# Patient Record
Sex: MALE | Race: WHITE | NOT HISPANIC OR LATINO | Employment: OTHER | URBAN - METROPOLITAN AREA
[De-identification: names, ages, dates, MRNs, and addresses within clinical notes are randomized per-mention and may not be internally consistent; named-entity substitution may affect disease eponyms.]

---

## 2023-02-27 PROBLEM — B36.0 TINEA VERSICOLOR: Status: ACTIVE | Noted: 2023-02-27

## 2023-02-27 PROBLEM — G25.0 BENIGN ESSENTIAL TREMOR: Status: ACTIVE | Noted: 2023-02-27

## 2023-02-27 PROBLEM — H47.20 LEFT OPTIC NERVE ATROPHY: Status: ACTIVE | Noted: 2023-02-27

## 2023-02-27 PROBLEM — E55.9 VITAMIN D DEFICIENCY: Status: ACTIVE | Noted: 2023-02-27

## 2023-02-27 PROBLEM — R29.898 WEAKNESS OF EXTREMITY: Status: ACTIVE | Noted: 2023-02-27

## 2023-02-27 PROBLEM — L11.1 TRANSIENT ACANTHOLYTIC DERMATOSIS: Status: ACTIVE | Noted: 2023-02-27

## 2023-02-27 PROBLEM — R73.03 PREDIABETES: Status: ACTIVE | Noted: 2023-02-27

## 2023-02-27 PROBLEM — H40.9 GLAUCOMA: Status: ACTIVE | Noted: 2023-02-27

## 2023-02-27 PROBLEM — M79.10 MYALGIA: Status: ACTIVE | Noted: 2023-02-27

## 2023-02-27 PROBLEM — R73.9 ELEVATED BLOOD SUGAR: Status: ACTIVE | Noted: 2023-02-27

## 2023-02-27 PROBLEM — R10.13 EPIGASTRIC PAIN DETERMINED BY EXAMINATION: Status: ACTIVE | Noted: 2023-02-27

## 2023-02-27 PROBLEM — M54.32 SCIATICA OF LEFT SIDE: Status: ACTIVE | Noted: 2023-02-27

## 2023-02-27 PROBLEM — M35.3 POLYMYALGIA RHEUMATICA (MULTI): Status: ACTIVE | Noted: 2023-02-27

## 2023-02-27 PROBLEM — M54.50 LOW BACK PAIN: Status: ACTIVE | Noted: 2023-02-27

## 2023-02-27 PROBLEM — M26.69 TMJ INFLAMMATION: Status: ACTIVE | Noted: 2023-02-27

## 2023-02-27 PROBLEM — N48.1 BALANITIS: Status: ACTIVE | Noted: 2023-02-27

## 2023-02-27 PROBLEM — H25.10 NUCLEAR SCLEROSIS: Status: ACTIVE | Noted: 2023-02-27

## 2023-02-27 PROBLEM — K60.2 ANAL FISSURE: Status: ACTIVE | Noted: 2023-02-27

## 2023-02-27 PROBLEM — L28.2 PRURITIC RASH: Status: ACTIVE | Noted: 2023-02-27

## 2023-02-27 RX ORDER — GABAPENTIN 100 MG/1
100 CAPSULE ORAL 2 TIMES DAILY
COMMUNITY
End: 2023-03-16 | Stop reason: SDUPTHER

## 2023-02-28 ENCOUNTER — HOSPITAL ENCOUNTER (INPATIENT)
Dept: DATA CONVERSION | Facility: HOSPITAL | Age: 78
Discharge: HOME HEALTH | End: 2023-03-05
Attending: STUDENT IN AN ORGANIZED HEALTH CARE EDUCATION/TRAINING PROGRAM | Admitting: STUDENT IN AN ORGANIZED HEALTH CARE EDUCATION/TRAINING PROGRAM
Payer: MEDICARE

## 2023-02-28 DIAGNOSIS — M54.12 RADICULOPATHY, CERVICAL REGION: ICD-10-CM

## 2023-02-28 DIAGNOSIS — M89.9 DISORDER OF BONE, UNSPECIFIED: ICD-10-CM

## 2023-02-28 DIAGNOSIS — R91.8 OTHER NONSPECIFIC ABNORMAL FINDING OF LUNG FIELD: ICD-10-CM

## 2023-02-28 DIAGNOSIS — H40.9 UNSPECIFIED GLAUCOMA: ICD-10-CM

## 2023-02-28 DIAGNOSIS — Z88.8 ALLERGY STATUS TO OTHER DRUGS, MEDICAMENTS AND BIOLOGICAL SUBSTANCES: ICD-10-CM

## 2023-02-28 DIAGNOSIS — R63.4 ABNORMAL WEIGHT LOSS: ICD-10-CM

## 2023-02-28 DIAGNOSIS — Z77.22 CONTACT WITH AND (SUSPECTED) EXPOSURE TO ENVIRONMENTAL TOBACCO SMOKE (ACUTE) (CHRONIC): ICD-10-CM

## 2023-02-28 DIAGNOSIS — M84.48XA PATHOLOGICAL FRACTURE, OTHER SITE, INITIAL ENCOUNTER FOR FRACTURE: ICD-10-CM

## 2023-02-28 DIAGNOSIS — C61 MALIGNANT NEOPLASM OF PROSTATE (MULTI): ICD-10-CM

## 2023-02-28 DIAGNOSIS — M84.421A: ICD-10-CM
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
## 2023-02-28 DIAGNOSIS — R73.03 PREDIABETES: ICD-10-CM

## 2023-02-28 DIAGNOSIS — I10 ESSENTIAL (PRIMARY) HYPERTENSION: ICD-10-CM

## 2023-02-28 DIAGNOSIS — D63.0 ANEMIA IN NEOPLASTIC DISEASE: ICD-10-CM

## 2023-02-28 DIAGNOSIS — M54.16 RADICULOPATHY, LUMBAR REGION: ICD-10-CM

## 2023-02-28 DIAGNOSIS — C79.51 SECONDARY MALIGNANT NEOPLASM OF BONE (MULTI): ICD-10-CM

## 2023-02-28 DIAGNOSIS — R59.0 LOCALIZED ENLARGED LYMPH NODES: ICD-10-CM

## 2023-02-28 LAB
POCT GLUCOSE: 125 MG/DL (ref 74–99)
POCT GLUCOSE: 133 MG/DL (ref 74–99)
POCT GLUCOSE: 135 MG/DL (ref 74–99)

## 2023-03-01 LAB
ALANINE AMINOTRANSFERASE (SGPT) (U/L) IN SER/PLAS: 14 U/L (ref 10–52)
ALBUMIN (G/DL) IN SER/PLAS: 3.1 G/DL (ref 3.4–5)
ALKALINE PHOSPHATASE (U/L) IN SER/PLAS: 450 U/L (ref 33–136)
ANION GAP IN SER/PLAS: 16 MMOL/L (ref 10–20)
ASPARTATE AMINOTRANSFERASE (SGOT) (U/L) IN SER/PLAS: 27 U/L (ref 9–39)
BASOPHILS (10*3/UL) IN BLOOD BY AUTOMATED COUNT: 0.04 X10E9/L (ref 0–0.1)
BASOPHILS/100 LEUKOCYTES IN BLOOD BY AUTOMATED COUNT: 0.7 % (ref 0–2)
BILIRUBIN TOTAL (MG/DL) IN SER/PLAS: 0.4 MG/DL (ref 0–1.2)
CALCIUM (MG/DL) IN SER/PLAS: 8.3 MG/DL (ref 8.6–10.6)
CARBON DIOXIDE, TOTAL (MMOL/L) IN SER/PLAS: 25 MMOL/L (ref 21–32)
CHLORIDE (MMOL/L) IN SER/PLAS: 100 MMOL/L (ref 98–107)
CREATININE (MG/DL) IN SER/PLAS: 0.54 MG/DL (ref 0.5–1.3)
EOSINOPHILS (10*3/UL) IN BLOOD BY AUTOMATED COUNT: 0.1 X10E9/L (ref 0–0.4)
EOSINOPHILS/100 LEUKOCYTES IN BLOOD BY AUTOMATED COUNT: 1.7 % (ref 0–6)
ERYTHROCYTE DISTRIBUTION WIDTH (RATIO) BY AUTOMATED COUNT: 16.3 % (ref 11.5–14.5)
ERYTHROCYTE MEAN CORPUSCULAR HEMOGLOBIN CONCENTRATION (G/DL) BY AUTOMATED: 30.4 G/DL (ref 32–36)
ERYTHROCYTE MEAN CORPUSCULAR VOLUME (FL) BY AUTOMATED COUNT: 94 FL (ref 80–100)
ERYTHROCYTES (10*6/UL) IN BLOOD BY AUTOMATED COUNT: 3.3 X10E12/L (ref 4.5–5.9)
FERRITIN (UG/LL) IN SER/PLAS: 1237 UG/L (ref 20–300)
GFR MALE: >90 ML/MIN/1.73M2
GLUCOSE (MG/DL) IN SER/PLAS: 286 MG/DL (ref 74–99)
HEMATOCRIT (%) IN BLOOD BY AUTOMATED COUNT: 30.9 % (ref 41–52)
HEMOGLOBIN (G/DL) IN BLOOD: 9.4 G/DL (ref 13.5–17.5)
IMMATURE GRANULOCYTES/100 LEUKOCYTES IN BLOOD BY AUTOMATED COUNT: 4.3 % (ref 0–0.9)
IRON (UG/DL) IN SER/PLAS: 61 UG/DL (ref 35–150)
IRON BINDING CAPACITY (UG/DL) IN SER/PLAS: 223 UG/DL (ref 240–445)
IRON SATURATION (%) IN SER/PLAS: 27 % (ref 25–45)
LEUKOCYTES (10*3/UL) IN BLOOD BY AUTOMATED COUNT: 5.8 X10E9/L (ref 4.4–11.3)
LYMPHOCYTES (10*3/UL) IN BLOOD BY AUTOMATED COUNT: 1 X10E9/L (ref 0.8–3)
LYMPHOCYTES/100 LEUKOCYTES IN BLOOD BY AUTOMATED COUNT: 17.2 % (ref 13–44)
MAGNESIUM (MG/DL) IN SER/PLAS: 1.92 MG/DL (ref 1.6–2.4)
MONOCYTES (10*3/UL) IN BLOOD BY AUTOMATED COUNT: 0.71 X10E9/L (ref 0.05–0.8)
MONOCYTES/100 LEUKOCYTES IN BLOOD BY AUTOMATED COUNT: 12.2 % (ref 2–10)
NEUTROPHILS (10*3/UL) IN BLOOD BY AUTOMATED COUNT: 3.71 X10E9/L (ref 1.6–5.5)
NEUTROPHILS/100 LEUKOCYTES IN BLOOD BY AUTOMATED COUNT: 63.9 % (ref 40–80)
NRBC (PER 100 WBCS) BY AUTOMATED COUNT: 0 /100 WBC (ref 0–0)
PLATELETS (10*3/UL) IN BLOOD AUTOMATED COUNT: 311 X10E9/L (ref 150–450)
POTASSIUM (MMOL/L) IN SER/PLAS: 3.5 MMOL/L (ref 3.5–5.3)
PROTEIN TOTAL: 5.2 G/DL (ref 6.4–8.2)
PROTEIN TOTAL: 5.2 G/DL (ref 6.4–8.2)
SODIUM (MMOL/L) IN SER/PLAS: 137 MMOL/L (ref 136–145)
UREA NITROGEN (MG/DL) IN SER/PLAS: 10 MG/DL (ref 6–23)

## 2023-03-02 LAB
ALANINE AMINOTRANSFERASE (SGPT) (U/L) IN SER/PLAS: 17 U/L (ref 10–52)
ALBUMIN (G/DL) IN SER/PLAS: 3.1 G/DL (ref 3.4–5)
ALKALINE PHOSPHATASE (U/L) IN SER/PLAS: 443 U/L (ref 33–136)
ANION GAP IN SER/PLAS: 16 MMOL/L (ref 10–20)
ASPARTATE AMINOTRANSFERASE (SGOT) (U/L) IN SER/PLAS: 30 U/L (ref 9–39)
BILIRUBIN TOTAL (MG/DL) IN SER/PLAS: 0.4 MG/DL (ref 0–1.2)
CALCIUM (MG/DL) IN SER/PLAS: 8.3 MG/DL (ref 8.6–10.6)
CARBON DIOXIDE, TOTAL (MMOL/L) IN SER/PLAS: 27 MMOL/L (ref 21–32)
CHLORIDE (MMOL/L) IN SER/PLAS: 101 MMOL/L (ref 98–107)
CREATININE (MG/DL) IN SER/PLAS: 0.63 MG/DL (ref 0.5–1.3)
ERYTHROCYTE DISTRIBUTION WIDTH (RATIO) BY AUTOMATED COUNT: 16.1 % (ref 11.5–14.5)
ERYTHROCYTE MEAN CORPUSCULAR HEMOGLOBIN CONCENTRATION (G/DL) BY AUTOMATED: 30.5 G/DL (ref 32–36)
ERYTHROCYTE MEAN CORPUSCULAR VOLUME (FL) BY AUTOMATED COUNT: 93 FL (ref 80–100)
ERYTHROCYTES (10*6/UL) IN BLOOD BY AUTOMATED COUNT: 3.13 X10E12/L (ref 4.5–5.9)
GFR MALE: >90 ML/MIN/1.73M2
GLUCOSE (MG/DL) IN SER/PLAS: 120 MG/DL (ref 74–99)
HEMATOCRIT (%) IN BLOOD BY AUTOMATED COUNT: 29.2 % (ref 41–52)
HEMOGLOBIN (G/DL) IN BLOOD: 8.9 G/DL (ref 13.5–17.5)
LEUKOCYTES (10*3/UL) IN BLOOD BY AUTOMATED COUNT: 7 X10E9/L (ref 4.4–11.3)
MAGNESIUM (MG/DL) IN SER/PLAS: 2.16 MG/DL (ref 1.6–2.4)
NRBC (PER 100 WBCS) BY AUTOMATED COUNT: 0 /100 WBC (ref 0–0)
PHOSPHATE (MG/DL) IN SER/PLAS: 3.8 MG/DL (ref 2.5–4.9)
PLATELETS (10*3/UL) IN BLOOD AUTOMATED COUNT: 356 X10E9/L (ref 150–450)
POTASSIUM (MMOL/L) IN SER/PLAS: 3.7 MMOL/L (ref 3.5–5.3)
PROTEIN TOTAL: 5.2 G/DL (ref 6.4–8.2)
SODIUM (MMOL/L) IN SER/PLAS: 140 MMOL/L (ref 136–145)
UREA NITROGEN (MG/DL) IN SER/PLAS: 12 MG/DL (ref 6–23)

## 2023-03-03 LAB
ALANINE AMINOTRANSFERASE (SGPT) (U/L) IN SER/PLAS: 14 U/L (ref 10–52)
ALBUMIN (G/DL) IN SER/PLAS: 2.9 G/DL (ref 3.4–5)
ALBUMIN/PROTEIN TOTAL (%) IN URINE BY ELECTROPHORESIS: 27.7 %
ALKALINE PHOSPHATASE (U/L) IN SER/PLAS: 401 U/L (ref 33–136)
ALPHA 1 GLOBULIN/PROTEIN TOTAL (%) IN URINE BY ELECTROPHORESIS: 12.6 %
ALPHA 2 GLOBULIN/PROTEIN TOTAL (%) IN URINE BY ELECTROPHORESIS: 29.2 %
ANION GAP IN SER/PLAS: 14 MMOL/L (ref 10–20)
ASPARTATE AMINOTRANSFERASE (SGOT) (U/L) IN SER/PLAS: 27 U/L (ref 9–39)
BETA GLOBULIN/PROTEIN TOTAL (%) IN URINE BY ELECTROPHORESIS: 17.2 %
BILIRUBIN TOTAL (MG/DL) IN SER/PLAS: 0.5 MG/DL (ref 0–1.2)
CALCIUM (MG/DL) IN SER/PLAS: 8.2 MG/DL (ref 8.6–10.6)
CARBON DIOXIDE, TOTAL (MMOL/L) IN SER/PLAS: 28 MMOL/L (ref 21–32)
CHLORIDE (MMOL/L) IN SER/PLAS: 101 MMOL/L (ref 98–107)
CREATININE (MG/DL) IN SER/PLAS: 0.66 MG/DL (ref 0.5–1.3)
ERYTHROCYTE DISTRIBUTION WIDTH (RATIO) BY AUTOMATED COUNT: 16.4 % (ref 11.5–14.5)
ERYTHROCYTE MEAN CORPUSCULAR HEMOGLOBIN CONCENTRATION (G/DL) BY AUTOMATED: 31 G/DL (ref 32–36)
ERYTHROCYTE MEAN CORPUSCULAR VOLUME (FL) BY AUTOMATED COUNT: 93 FL (ref 80–100)
ERYTHROCYTES (10*6/UL) IN BLOOD BY AUTOMATED COUNT: 2.99 X10E12/L (ref 4.5–5.9)
GAMMA GLOBULIN/PROTEIN TOTAL (%) IN URINE BY ELECTROPHORESIS: 13.3 %
GFR MALE: >90 ML/MIN/1.73M2
GLUCOSE (MG/DL) IN SER/PLAS: 105 MG/DL (ref 74–99)
HEMATOCRIT (%) IN BLOOD BY AUTOMATED COUNT: 27.7 % (ref 41–52)
HEMOGLOBIN (G/DL) IN BLOOD: 8.6 G/DL (ref 13.5–17.5)
IMMUNOFIXATION INTERPRETATION: ABNORMAL
LEUKOCYTES (10*3/UL) IN BLOOD BY AUTOMATED COUNT: 6.3 X10E9/L (ref 4.4–11.3)
M-PROTEIN 1 URINE %: 2.4 %
MAGNESIUM (MG/DL) IN SER/PLAS: 2.04 MG/DL (ref 1.6–2.4)
NRBC (PER 100 WBCS) BY AUTOMATED COUNT: 0 /100 WBC (ref 0–0)
PATH REVIEW - URINE IMMUNOFIXATION: NORMAL
PATH REVIEW-URINE PROTEIN ELECTROPHORESIS: NORMAL
PLATELETS (10*3/UL) IN BLOOD AUTOMATED COUNT: 343 X10E9/L (ref 150–450)
POTASSIUM (MMOL/L) IN SER/PLAS: 4.1 MMOL/L (ref 3.5–5.3)
PROTEIN (MG/DL) IN URINE: 32 MG/DL (ref 5–25)
PROTEIN TOTAL: 4.8 G/DL (ref 6.4–8.2)
SODIUM (MMOL/L) IN SER/PLAS: 139 MMOL/L (ref 136–145)
UPEP INTERPRETATION: ABNORMAL
UREA NITROGEN (MG/DL) IN SER/PLAS: 11 MG/DL (ref 6–23)

## 2023-03-03 NOTE — H&P
History of Present Illness:   HPI:    Shalom Horton is a 78 yo male with a history of severe degenerative changes of the spine causing chronic radicular leg and arm pain, and glaucoma who presented to [facility] ED  on 2/27 after he lost his balance putting on his shoes. He grabbed onto the door to try and stop his fall and heard a pop followed by pain prompting him to present to the ER for evaluation. In the ER he had an XR which showed a fracture of the mid-shaft  of the R humerus as well as sclerotic lesions concerning for metastatic disease. Humerus fracture was concerning for a pathologic fracture. Patient was discussed with ortho who recommended admission to medicine with ortho consult for evaluation and management  of pathologic fracture.     Of note, patient had a MRI of the cervical spine (at [facility] 12/29/2022, report available in ambulatory EMR) at the end of December due to new onset left arm pain, numbness, and tingling. This MRI showed 1. Diffusely decreased signal on T1-weighted  imaging in the visualized bone marrow, which is nonspecific and can be seen with hematopoietic marrow in the setting of anemia, however diffuse marrow replacing process, including neoplastic processes are not excluded. Additionally, there is possible  cortical breakthrough of soft tissue at the C7-T1 level on the left. Correlate for history of primary metastatic disease. Recommend bone scan, PET-CT, or CT of the chest, abdomen, and pelvis for further evaluation. 2. Moderate spinal canal narrowing at  C5-C6 and mild to moderate spinal canal narrowing at C6-C7. Mild spinal canal narrowing at C3-C4 and C4-C5. 3. Otherwise, multilevele degenerative changes over the cervical spine as described, including severe foraminal narrowing at some levels.    His orthopedist ordered a PET-CT for further evaluation which was scheduled for 2/27 (unknown if patient got this prior to going to ER).    ED Course:  Vitals: T 36.6 C, HR  "84, RR 18, /80, O2 96% on RA  Diagnostics:  CBC: 7.7 > 9.5 / 29.3 < 318  BMP: 138 / 4.1 / 102 / 24 / 14 / 0.56 < 135  Ca: 9.1  LFTs: Alb 3.7, Pro 6.6, , ALT 19, AST 46, Bili 0.4  Coags: PT 13.2, INR 1.1  COVID: negative  XR humerus/shoulder: Angulated possibly subacute and/or pathologic fracture of the humeral shaft.  Multiple sclerotic osseous lesions suspicious for metastatic disease. Nodular interstitial prominence in the right lung field could reflect additional malignancy. Further evaluation recommended.  CT UE: Midshaft fracture of the right humerus, possibly pathologic.  Evidence of metastatic disease.    Interventions: morphine 4 mg IVP q3h PRN, Norco x1    PMHx: as above, prediabetes  PSurgHx: BL knee surgeries, heel surgery, appendectomy, eye surgeries  Meds: see reconciled med list  Allergies: beta blockers  Social Hx: denies T/E/D, lives with wife, hx of [employer] service in [facility], worked in [employer] with \"[employer]\" for several years   FamHx: HTN, CAD, diabetes in sister; bone cancer in an aunt, unspecified cancer in an uncle    PCP: Gian Waller   Code Status: FULL CODE  Emergency Contact: Marnie (Wife - [phone])  Pharmacy: [facility] ([id number])      Social History:   Social History:  Smoking Status never smoker (1)   Alcohol Use denies(1)   Drug Use denies (1)              Allergies:  ·  beta blockers : Unknown    Medications Prior to Admission:     gabapentin 100 mg oral capsule: 1 cap(s) orally 2 times a day  tafluprost 0.0015% ophthalmic solution: 1 drop(s) to LEFT EYE once a day (in the evening)  cholecalciferol 250 mcg (10,000 intl units) oral capsule: 1 cap(s) orally once a day  Vitamin C 1000 mg oral tablet: 1 tab(s) orally once a day  Fish Oil 1000 mg oral capsule: 1 cap(s) orally once a day  Betaine HCL 650m cap(s) orally 3 times a day (with meals).    Objective:     Objective Information:      T   P  R  BP   MAP  SpO2 "   Value  36.8  85  15  174/92   119  94%  Date/Time 2/28 10:26 2/28 10:26 2/28 10:26 2/28 10:26 2/28 10:26 2/28 10:26  Range  (36.8C - 36.8C )  (85 - 85 )  (15 - 15 )  (174 - 174 )/ (92 - 92 )  (119 - 119 )  (94% - 94% )      Pain reported at 2/28 11:15: 8 = Severe    Physical Exam Narrative:  ·  Physical Exam:    Constitutional: Well developed, NAD, AAOx3, alert and cooperative  Head: NCAT  Mouth: MMM  Respiratory: Patent airways, CTAB, symmetric chest expansion, thorax symmetric  Cardiovascular: Regular, rate and rhythm, no murmurs, normal S 1 and S 2  Upper extremities: R arm in sling, ttp over mid arm, ROM intact at elbow and wrist; sensation intact, radial pulse 2+; LUE: swelling and discoloration over proximal forearm at previous IV site; otherwise unremarkable.   Extremities: no visible discoloration, lesions or edema; warm, non-tender; dorsalis pedis pulses 2+, normal bilaterally  Psychological: Appropriate mood and affect    Medications:    Medications:      ALTERNATIVE MEDICINES:    1. Melatonin:  3  mg  Oral  At Bedtime   PRN       2. Omega-3 Acid Ethyl Esters:  1  gram(s)  Oral  Daily      CENTRAL NERVOUS SYSTEM AGENTS:    1. Acetaminophen:  650  mg  Oral  Every 6 Hours   PRN       2. oxyCODONE Immediate Release:  5  mg  Oral  Every 4 Hours   PRN       3. Gabapentin:  100  mg  Oral  2 Times a Day      COAGULATION MODIFIERS:    1. Enoxaparin SubCutaneous:  40  mg  SubCutaneous  Every 24 Hours      GASTROINTESTINAL AGENTS:    1. Docusate:  100  mg  Oral  2 Times a Day      NUTRITIONAL PRODUCTS:    1. Sodium Chloride 0.9% Injectable Flush:  10  mL  IntraVenous Flush  Every 8 Hours and as Needed   PRN         TOPICAL AGENTS:    1. Latanoprost 0.005% Ophthalmic:  1  drop(s)  Both Eyes  At Bedtime      Recent Lab Results:    Results:    CBC: 2/27/2023 14:45              \     Hgb     /                              \     9.5 L    /  WBC  ----------------  Plt               7.7       ----------------    318               /     Hct     \                              /     29.3 L    \            RBC: 3.24 L    MCV: 90     Neutrophil %: 74.0      CMP: 2/27/2023 14:45  NA+        Cl-     BUN  /                         138    102    14  /  --------------------------------  Glucose                ---------------------------  135 H    K+     HCO3-   Creat \                         4.1    24    0.56  \           \  T Bili  /                    \  0.4  /  AST  x ---- x ALT        46 Hx ---- x 19         /  Alk P   \               /  467 H \  Calcium : 9.1     Anion Gap : 16     Albumin : 3.7     T Protein : 6.6           Coagulation: 2/27/2023 14:45  PT  /                    13.2  /  -------<    INR          ----------<      1.1  PTT\                              \                       Radiology Results:    Results:        Impression:    Midshaft fracture of the right humerus, possibly pathologic.     Evidence of metastatic disease.        CT Up Extremity without Contrast [Feb 27 2023 12:16PM]      Impression:    Angulated possibly subacute and/or pathologic fracture of the humeral  shaft.     Multiple sclerotic osseous lesions suspicious for metastatic disease.  Nodular interstitial prominence in the right lung field could reflect  additional malignancy. Further evaluation recommended.     Xray Humerus 2 View [Feb 27 2023 11:33AM]      Impression:    Angulated possibly subacute and/or pathologic fracture of the humeral  shaft.     Multiple sclerotic osseous lesions suspicious for metastatic disease.  Nodular interstitial prominence in the right lung field could reflect  additional malignancy. Further evaluation recommended.     Xray Shoulder Complete Min 2 Views [Feb 27 2023 11:33AM]      Assessment and Plan:   Assessment:    Shalom Horton is a 78yo male with a history of radiular pain x4 extremities d/t degenerative changes of the spine who presented to the ER for RUE pain after fall and was found  to have a likely  pathologic fracture of the R humerus. Now admitted for further evaluation and management of likely malignancy and humerus fracture. Patient hypertensive on admission, likely due to pain, otherwise vitals stable. Ortho on board, will likely  need malignancy workup, will consult heme/onc, however, may require outpatient workup.       #R humerus fracture  -ortho consulted  - type and screen, coags 2/27 at OSH  -pain control w/ acetaminophen 650 mg q6h PRN mild pain    -oxycodone 5 mg q4h PRN severe pain    #Metastatic bone disease  -f/up OSH PET-CT if done, if not done order CT chest/abdomen/pelvis  -regarding possible primaries, up to date on colon cancer screening, no documented hx of prostate cancer screening, no smoking history of lung cancer screening  -MRI findings w/ diffuse marrow findings raise concern for possible hematologic malignancy  -will consult heme/onc pending ortho plan     #Anemia  - unknown cause  - could be related to potential primary malignancy   - order iron studies + peripheral smear    #Glaucoma  - tafluprost 0.0015% - 1 gtt every day at home  - will substitute with formulary latanoprost while admitted     Dispo: Pending clinical course  F: replete as needed  E: replete as needed  N: NPO pending ortho recs  GI: pantoprazole 40 mg daily  DVT: lovenox sq  Code status: full code     Patient seen and discussed with Attending, Dr Jackson Mitchell.   Kacey Parikh MD (MJ).  PGY-1 Family Medicine  [facility]CHALO/[id number]      Attestation:   Note Completion:  I am a:  Resident/Fellow   Attending Attestation I saw and evaluated the patient.  I personally obtained the key and critical portions of the history and physical exam or was physically present for key and  critical portions performed by the resident/fellow. I reviewed the resident/fellow?s documentation and discussed the patient with the resident/fellow.  I agree with the resident/fellow?s medical decision making as documented in the note.     I personally  "evaluated the patient on 2023   Comments/ Additional Findings    FHX: maternal aunt (lifelong smoker)  of \"lung, bone, and blood cancer\".  SHX: 3 years in [employer], never exposed to Agent Orange. 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          Electronic Signatures:  Kacey Parikh (Resident))  (Signed 2023 15:49)   Authored: History of Present Illness, Comorbidities,  Social History, Allergies, Medications Prior to Admission, Objective, Assessment and Plan, Note Completion  Jackson Mitchell)  (Signed 01-Mar-2023 12:59)   Authored: Note Completion   Co-Signer: History of Present Illness, Comorbidities, Social History, Allergies, Medications Prior to Admission, Objective, Assessment  and Plan, Note Completion      Last Updated: 01-Mar-2023 12:59 by Jackson Mitchell)    References:  1.  Data Referenced From \"Provider Note - ED v3\" 2023 11:52   "

## 2023-03-04 LAB
ALANINE AMINOTRANSFERASE (SGPT) (U/L) IN SER/PLAS: 23 U/L (ref 10–52)
ALBUMIN (G/DL) IN SER/PLAS: 3.4 G/DL (ref 3.4–5)
ALKALINE PHOSPHATASE (U/L) IN SER/PLAS: 436 U/L (ref 33–136)
ANION GAP IN SER/PLAS: 14 MMOL/L (ref 10–20)
ASPARTATE AMINOTRANSFERASE (SGOT) (U/L) IN SER/PLAS: 31 U/L (ref 9–39)
ATRIAL RATE: 82 BPM
BASOPHILS (10*3/UL) IN BLOOD BY AUTOMATED COUNT: 0.05 X10E9/L (ref 0–0.1)
BASOPHILS/100 LEUKOCYTES IN BLOOD BY AUTOMATED COUNT: 0.8 % (ref 0–2)
BILIRUBIN TOTAL (MG/DL) IN SER/PLAS: 0.6 MG/DL (ref 0–1.2)
CALCIUM (MG/DL) IN SER/PLAS: 8.6 MG/DL (ref 8.6–10.6)
CARBON DIOXIDE, TOTAL (MMOL/L) IN SER/PLAS: 28 MMOL/L (ref 21–32)
CHLORIDE (MMOL/L) IN SER/PLAS: 99 MMOL/L (ref 98–107)
CREATININE (MG/DL) IN SER/PLAS: 0.59 MG/DL (ref 0.5–1.3)
EOSINOPHILS (10*3/UL) IN BLOOD BY AUTOMATED COUNT: 0.17 X10E9/L (ref 0–0.4)
EOSINOPHILS/100 LEUKOCYTES IN BLOOD BY AUTOMATED COUNT: 2.7 % (ref 0–6)
ERYTHROCYTE DISTRIBUTION WIDTH (RATIO) BY AUTOMATED COUNT: 16.4 % (ref 11.5–14.5)
ERYTHROCYTE MEAN CORPUSCULAR HEMOGLOBIN CONCENTRATION (G/DL) BY AUTOMATED: 31 G/DL (ref 32–36)
ERYTHROCYTE MEAN CORPUSCULAR VOLUME (FL) BY AUTOMATED COUNT: 92 FL (ref 80–100)
ERYTHROCYTES (10*6/UL) IN BLOOD BY AUTOMATED COUNT: 3.41 X10E12/L (ref 4.5–5.9)
GFR MALE: >90 ML/MIN/1.73M2
GLUCOSE (MG/DL) IN SER/PLAS: 136 MG/DL (ref 74–99)
HEMATOCRIT (%) IN BLOOD BY AUTOMATED COUNT: 31.3 % (ref 41–52)
HEMOGLOBIN (G/DL) IN BLOOD: 9.7 G/DL (ref 13.5–17.5)
IMMATURE GRANULOCYTES/100 LEUKOCYTES IN BLOOD BY AUTOMATED COUNT: 4.2 % (ref 0–0.9)
LEUKOCYTES (10*3/UL) IN BLOOD BY AUTOMATED COUNT: 6.2 X10E9/L (ref 4.4–11.3)
LYMPHOCYTES (10*3/UL) IN BLOOD BY AUTOMATED COUNT: 1.08 X10E9/L (ref 0.8–3)
LYMPHOCYTES/100 LEUKOCYTES IN BLOOD BY AUTOMATED COUNT: 17.4 % (ref 13–44)
MAGNESIUM (MG/DL) IN SER/PLAS: 2.17 MG/DL (ref 1.6–2.4)
MONOCYTES (10*3/UL) IN BLOOD BY AUTOMATED COUNT: 0.57 X10E9/L (ref 0.05–0.8)
MONOCYTES/100 LEUKOCYTES IN BLOOD BY AUTOMATED COUNT: 9.2 % (ref 2–10)
NEUTROPHILS (10*3/UL) IN BLOOD BY AUTOMATED COUNT: 4.09 X10E9/L (ref 1.6–5.5)
NEUTROPHILS/100 LEUKOCYTES IN BLOOD BY AUTOMATED COUNT: 65.7 % (ref 40–80)
NRBC (PER 100 WBCS) BY AUTOMATED COUNT: 0 /100 WBC (ref 0–0)
P AXIS: 30 DEGREES
P OFFSET: 182 MS
P ONSET: 124 MS
PLATELETS (10*3/UL) IN BLOOD AUTOMATED COUNT: 387 X10E9/L (ref 150–450)
POTASSIUM (MMOL/L) IN SER/PLAS: 4.2 MMOL/L (ref 3.5–5.3)
PR INTERVAL: 180 MS
PROTEIN TOTAL: 5.5 G/DL (ref 6.4–8.2)
Q ONSET: 214 MS
QRS COUNT: 14 BEATS
QRS DURATION: 90 MS
QT INTERVAL: 384 MS
QTC CALCULATION(BAZETT): 448 MS
QTC FREDERICIA: 426 MS
R AXIS: -7 DEGREES
SODIUM (MMOL/L) IN SER/PLAS: 137 MMOL/L (ref 136–145)
T AXIS: -6 DEGREES
T OFFSET: 406 MS
UREA NITROGEN (MG/DL) IN SER/PLAS: 14 MG/DL (ref 6–23)
VENTRICULAR RATE: 82 BPM

## 2023-03-06 LAB
ALANINE AMINOTRANSFERASE (SGPT) (U/L) IN SER/PLAS: NORMAL
ALBUMIN (G/DL) IN SER/PLAS: NORMAL
ALKALINE PHOSPHATASE (U/L) IN SER/PLAS: NORMAL
ANION GAP IN SER/PLAS: NORMAL
ASPARTATE AMINOTRANSFERASE (SGOT) (U/L) IN SER/PLAS: NORMAL
BASOPHILS (10*3/UL) IN BLOOD BY AUTOMATED COUNT: NORMAL
BASOPHILS/100 LEUKOCYTES IN BLOOD BY AUTOMATED COUNT: NORMAL
BILIRUBIN TOTAL (MG/DL) IN SER/PLAS: NORMAL
CALCIUM (MG/DL) IN SER/PLAS: NORMAL
CARBON DIOXIDE, TOTAL (MMOL/L) IN SER/PLAS: NORMAL
CHLORIDE (MMOL/L) IN SER/PLAS: NORMAL
CREATININE (MG/DL) IN SER/PLAS: NORMAL
EOSINOPHILS (10*3/UL) IN BLOOD BY AUTOMATED COUNT: NORMAL
EOSINOPHILS/100 LEUKOCYTES IN BLOOD BY AUTOMATED COUNT: NORMAL
ERYTHROCYTE DISTRIBUTION WIDTH (RATIO) BY AUTOMATED COUNT: NORMAL
ERYTHROCYTE MEAN CORPUSCULAR HEMOGLOBIN CONCENTRATION (G/DL) BY AUTOMATED: NORMAL
ERYTHROCYTE MEAN CORPUSCULAR VOLUME (FL) BY AUTOMATED COUNT: NORMAL
ERYTHROCYTES (10*6/UL) IN BLOOD BY AUTOMATED COUNT: NORMAL
GFR FEMALE: NORMAL
GFR MALE: NORMAL
GLUCOSE (MG/DL) IN SER/PLAS: NORMAL
HEMATOCRIT (%) IN BLOOD BY AUTOMATED COUNT: NORMAL
HEMOGLOBIN (G/DL) IN BLOOD: NORMAL
IMMATURE GRANULOCYTES/100 LEUKOCYTES IN BLOOD BY AUTOMATED COUNT: NORMAL
LEUKOCYTES (10*3/UL) IN BLOOD BY AUTOMATED COUNT: NORMAL
LYMPHOCYTES (10*3/UL) IN BLOOD BY AUTOMATED COUNT: NORMAL
LYMPHOCYTES/100 LEUKOCYTES IN BLOOD BY AUTOMATED COUNT: NORMAL
MAGNESIUM (MG/DL) IN SER/PLAS: NORMAL
MANUAL DIFFERENTIAL Y/N: NORMAL
MONOCYTES (10*3/UL) IN BLOOD BY AUTOMATED COUNT: NORMAL
MONOCYTES/100 LEUKOCYTES IN BLOOD BY AUTOMATED COUNT: NORMAL
NEUTROPHILS (10*3/UL) IN BLOOD BY AUTOMATED COUNT: NORMAL
NEUTROPHILS/100 LEUKOCYTES IN BLOOD BY AUTOMATED COUNT: NORMAL
NRBC (PER 100 WBCS) BY AUTOMATED COUNT: NORMAL
PLATELETS (10*3/UL) IN BLOOD AUTOMATED COUNT: NORMAL
POTASSIUM (MMOL/L) IN SER/PLAS: NORMAL
PROTEIN TOTAL: NORMAL
SODIUM (MMOL/L) IN SER/PLAS: NORMAL
UREA NITROGEN (MG/DL) IN SER/PLAS: NORMAL

## 2023-03-09 ENCOUNTER — APPOINTMENT (OUTPATIENT)
Dept: PRIMARY CARE | Facility: CLINIC | Age: 78
End: 2023-03-09
Payer: MEDICARE

## 2023-03-09 LAB
ALBUMIN ELP: 2.8 G/DL (ref 3.4–5)
ALPHA 1: 0.5 G/DL (ref 0.2–0.6)
ALPHA 2: 0.9 G/DL (ref 0.4–1.1)
BETA: 0.6 G/DL (ref 0.5–1.2)
GAMMA GLOBULIN: 0.3 G/DL (ref 0.5–1.4)
PATH REVIEW - SERUM IMMUNOFIXATION: NORMAL
PATH REVIEW-SERUM PROTEIN ELECTROPHORESIS: NORMAL
PROTEIN ELECTROPHORESIS INTERPRETATION: ABNORMAL
PROTEIN TOTAL: 5.2 G/DL (ref 6.4–8.2)
SERUM IMMUNOFIXATION INTERPRETATION: NORMAL

## 2023-03-13 LAB
COMPLETE PATHOLOGY REPORT: NORMAL
CONVERTED CLINICAL DIAGNOSIS-HISTORY: NORMAL
CONVERTED FINAL DIAGNOSIS: NORMAL
CONVERTED FINAL REPORT PDF LINK TO COPY AND PASTE: NORMAL
CONVERTED GROSS DESCRIPTION: NORMAL
CONVERTED INTRAOPERATIVE DIAGNOSIS: NORMAL

## 2023-03-16 ENCOUNTER — TELEPHONE (OUTPATIENT)
Dept: PRIMARY CARE | Facility: CLINIC | Age: 78
End: 2023-03-16
Payer: MEDICARE

## 2023-03-16 DIAGNOSIS — M79.10 MYALGIA: Primary | ICD-10-CM

## 2023-03-16 RX ORDER — GABAPENTIN 100 MG/1
100 CAPSULE ORAL 4 TIMES DAILY
Qty: 120 CAPSULE | Refills: 1 | Status: SHIPPED | OUTPATIENT
Start: 2023-03-16 | End: 2023-03-23 | Stop reason: SDUPTHER

## 2023-03-16 NOTE — TELEPHONE ENCOUNTER
Patient needs refill on gabapentin 100 mg. Used to be twice daily but was recently in hospital due to arm fracture and they had advised he take 1 pill in am, 1 at noon and 2 at bedtime. Need new script to reflect this change. Uses [facility]

## 2023-03-23 ENCOUNTER — OFFICE VISIT (OUTPATIENT)
Dept: PRIMARY CARE | Facility: CLINIC | Age: 78
End: 2023-03-23
Payer: MEDICARE

## 2023-03-23 VITALS
TEMPERATURE: 97.2 F | DIASTOLIC BLOOD PRESSURE: 66 MMHG | SYSTOLIC BLOOD PRESSURE: 142 MMHG | HEIGHT: 71 IN | BODY MASS INDEX: 27.02 KG/M2 | HEART RATE: 74 BPM | WEIGHT: 193 LBS | OXYGEN SATURATION: 94 %

## 2023-03-23 DIAGNOSIS — C61 PROSTATE CANCER (MULTI): ICD-10-CM

## 2023-03-23 DIAGNOSIS — M79.10 MYALGIA: Primary | ICD-10-CM

## 2023-03-23 DIAGNOSIS — R91.8 RIGHT LOWER LOBE LUNG MASS: ICD-10-CM

## 2023-03-23 DIAGNOSIS — S42.291S: ICD-10-CM

## 2023-03-23 PROBLEM — J98.4 CAVITATING MASS IN RIGHT LOWER LUNG LOBE: Status: ACTIVE | Noted: 2023-03-23

## 2023-03-23 PROBLEM — S42.291A CLOSED FRACTURE OF ANATOMICAL NECK OF RIGHT HUMERUS: Status: ACTIVE | Noted: 2023-03-23

## 2023-03-23 PROCEDURE — 1159F MED LIST DOCD IN RCRD: CPT | Performed by: FAMILY MEDICINE

## 2023-03-23 PROCEDURE — 1160F RVW MEDS BY RX/DR IN RCRD: CPT | Performed by: FAMILY MEDICINE

## 2023-03-23 PROCEDURE — 99214 OFFICE O/P EST MOD 30 MIN: CPT | Performed by: FAMILY MEDICINE

## 2023-03-23 RX ORDER — GABAPENTIN 300 MG/1
300 CAPSULE ORAL 3 TIMES DAILY
Qty: 90 CAPSULE | Refills: 2 | Status: SHIPPED | OUTPATIENT
Start: 2023-03-23 | End: 2023-04-27 | Stop reason: SDUPTHER

## 2023-03-23 ASSESSMENT — PATIENT HEALTH QUESTIONNAIRE - PHQ9
1. LITTLE INTEREST OR PLEASURE IN DOING THINGS: NOT AT ALL
SUM OF ALL RESPONSES TO PHQ9 QUESTIONS 1 AND 2: 0
2. FEELING DOWN, DEPRESSED OR HOPELESS: NOT AT ALL

## 2023-03-23 NOTE — PROGRESS NOTES
"Subjective   Patient ID: Shalom Horton is a 77 y.o. male who presents for Follow-up (Follow up on arm and pain).    Patient injured arm which prompted him to come to the ER he had an XR which showed a fracture of the mid-shaft  of the R humerus as well as sclerotic lesions concerning for metastatic disease from prostate cancer. Patient had surgery on his arm, healing well. Doing PT 3x a week.  Now starting formal treatment for prostate cancer. Following Dr. Yan at [facility].  Still struggling with nerve pain in legs and weakness. Using gabapentin. Also using tylenol 3x a day and advil pm.          Review of Systems   All other systems reviewed and are negative.      Objective   /66   Pulse 74   Temp 36.2 °C (97.2 °F)   Ht 1.803 m (5' 11\")   Wt 87.5 kg (193 lb)   SpO2 94%   BMI 26.92 kg/m²     Physical Exam  Constitutional:       General: He is not in acute distress.     Appearance: Normal appearance. He is normal weight. He is not ill-appearing.   HENT:      Head: Normocephalic and atraumatic.   Eyes:      Extraocular Movements: Extraocular movements intact.      Pupils: Pupils are equal, round, and reactive to light.   Pulmonary:      Effort: Pulmonary effort is normal. No respiratory distress.   Musculoskeletal:         General: No deformity.      Right lower leg: No edema.      Left lower leg: No edema.   Neurological:      Mental Status: He is alert and oriented to person, place, and time. Mental status is at baseline.      Motor: Weakness present.   Psychiatric:         Mood and Affect: Mood normal.         Behavior: Behavior normal.         Assessment/Plan   Problem List Items Addressed This Visit       Myalgia - Primary     Increased Gabapentin 300 mg 2-3 x day         Prostate cancer (CMS/HCC)    Closed fracture of anatomical neck of right humerus    Right lower lobe lung mass     Will be seeing Dr Banuelos and Dr Yan for oncology radiology  Getting Bone scan   Get PET/Scan      "

## 2023-04-17 ENCOUNTER — TELEPHONE (OUTPATIENT)
Dept: PRIMARY CARE | Facility: CLINIC | Age: 78
End: 2023-04-17
Payer: MEDICARE

## 2023-04-17 ENCOUNTER — HOSPITAL ENCOUNTER (OUTPATIENT)
Dept: DATA CONVERSION | Facility: HOSPITAL | Age: 78
End: 2023-04-17
Attending: RADIOLOGY
Payer: MEDICARE

## 2023-04-17 DIAGNOSIS — I10 ESSENTIAL (PRIMARY) HYPERTENSION: ICD-10-CM

## 2023-04-17 DIAGNOSIS — R91.8 OTHER NONSPECIFIC ABNORMAL FINDING OF LUNG FIELD: ICD-10-CM

## 2023-04-17 DIAGNOSIS — C61 MALIGNANT NEOPLASM OF PROSTATE (MULTI): ICD-10-CM

## 2023-04-17 DIAGNOSIS — Z85.46 PERSONAL HISTORY OF MALIGNANT NEOPLASM OF PROSTATE: ICD-10-CM

## 2023-04-17 DIAGNOSIS — C34.31 MALIGNANT NEOPLASM OF LOWER LOBE, RIGHT BRONCHUS OR LUNG (MULTI): ICD-10-CM

## 2023-04-17 NOTE — TELEPHONE ENCOUNTER
Shalom has been having a lot of pain in his leg and Mrs Rueda was wondering if they could increase his gabapentin until he sees you next Thursday. Told her someone would get back to her.    4/18/23 3:49PM  CALLED MRS RUEDA BACK TO LET HER KNOW OK TO TAKE 2 CAPS 3 TIMES A DAY.  SHE UNDERSTANDS.

## 2023-04-20 LAB
COMPLETE PATHOLOGY REPORT: NORMAL
CONVERTED ADDENDUM DIAGNOSIS 2: NORMAL
CONVERTED ADDENDUM DIAGNOSIS 3: NORMAL
CONVERTED CLINICAL DIAGNOSIS-HISTORY: NORMAL
CONVERTED FINAL DIAGNOSIS: NORMAL
CONVERTED FINAL REPORT PDF LINK TO COPY AND PASTE: NORMAL
CONVERTED GROSS DESCRIPTION: NORMAL

## 2023-04-27 ENCOUNTER — OFFICE VISIT (OUTPATIENT)
Dept: PRIMARY CARE | Facility: CLINIC | Age: 78
End: 2023-04-27
Payer: MEDICARE

## 2023-04-27 VITALS
SYSTOLIC BLOOD PRESSURE: 150 MMHG | BODY MASS INDEX: 27.02 KG/M2 | OXYGEN SATURATION: 98 % | HEIGHT: 71 IN | HEART RATE: 71 BPM | WEIGHT: 193 LBS | TEMPERATURE: 97.6 F | DIASTOLIC BLOOD PRESSURE: 72 MMHG

## 2023-04-27 DIAGNOSIS — Z12.11 ENCOUNTER FOR SCREENING FOR MALIGNANT NEOPLASM OF COLON: ICD-10-CM

## 2023-04-27 DIAGNOSIS — Z00.00 MEDICARE ANNUAL WELLNESS VISIT, SUBSEQUENT: Primary | ICD-10-CM

## 2023-04-27 DIAGNOSIS — M79.10 MYALGIA: ICD-10-CM

## 2023-04-27 DIAGNOSIS — M54.41 CHRONIC RIGHT-SIDED LOW BACK PAIN WITH RIGHT-SIDED SCIATICA: ICD-10-CM

## 2023-04-27 DIAGNOSIS — C61 PROSTATE CANCER (MULTI): ICD-10-CM

## 2023-04-27 DIAGNOSIS — C34.91 ADENOCARCINOMA, LUNG, RIGHT (MULTI): ICD-10-CM

## 2023-04-27 DIAGNOSIS — G89.29 CHRONIC RIGHT-SIDED LOW BACK PAIN WITH RIGHT-SIDED SCIATICA: ICD-10-CM

## 2023-04-27 PROCEDURE — 1160F RVW MEDS BY RX/DR IN RCRD: CPT | Performed by: FAMILY MEDICINE

## 2023-04-27 PROCEDURE — G0439 PPPS, SUBSEQ VISIT: HCPCS | Performed by: FAMILY MEDICINE

## 2023-04-27 PROCEDURE — 1159F MED LIST DOCD IN RCRD: CPT | Performed by: FAMILY MEDICINE

## 2023-04-27 PROCEDURE — 99214 OFFICE O/P EST MOD 30 MIN: CPT | Performed by: FAMILY MEDICINE

## 2023-04-27 PROCEDURE — 1036F TOBACCO NON-USER: CPT | Performed by: FAMILY MEDICINE

## 2023-04-27 PROCEDURE — 1170F FXNL STATUS ASSESSED: CPT | Performed by: FAMILY MEDICINE

## 2023-04-27 RX ORDER — OMEGA-3 FATTY ACIDS/FISH OIL 340-1000MG
CAPSULE ORAL
COMMUNITY

## 2023-04-27 RX ORDER — GABAPENTIN 300 MG/1
300 CAPSULE ORAL 4 TIMES DAILY
Qty: 120 CAPSULE | Refills: 11 | Status: SHIPPED | OUTPATIENT
Start: 2023-04-27 | End: 2023-05-03

## 2023-04-27 RX ORDER — CEPHRADINE 500 MG
CAPSULE ORAL
COMMUNITY

## 2023-04-27 RX ORDER — TAFLUPROST OPTHALMIC 0 MG/.3ML
SOLUTION/ DROPS OPHTHALMIC
COMMUNITY

## 2023-04-27 RX ORDER — TRIAMCINOLONE ACETONIDE 1 MG/G
CREAM TOPICAL
COMMUNITY
Start: 2023-04-14

## 2023-04-27 RX ORDER — BICALUTAMIDE 50 MG/1
TABLET, FILM COATED ORAL DAILY
COMMUNITY
End: 2023-10-09 | Stop reason: SDUPTHER

## 2023-04-27 RX ORDER — ACETAMINOPHEN 325 MG/1
2 TABLET ORAL EVERY 6 HOURS PRN
COMMUNITY
Start: 2023-03-05

## 2023-04-27 ASSESSMENT — PATIENT HEALTH QUESTIONNAIRE - PHQ9
SUM OF ALL RESPONSES TO PHQ9 QUESTIONS 1 AND 2: 0
2. FEELING DOWN, DEPRESSED OR HOPELESS: NOT AT ALL
1. LITTLE INTEREST OR PLEASURE IN DOING THINGS: NOT AT ALL

## 2023-04-27 ASSESSMENT — ACTIVITIES OF DAILY LIVING (ADL)
DOING_HOUSEWORK: INDEPENDENT
MANAGING_FINANCES: INDEPENDENT
BATHING: INDEPENDENT
TAKING_MEDICATION: INDEPENDENT
GROCERY_SHOPPING: INDEPENDENT
DRESSING: INDEPENDENT

## 2023-04-27 NOTE — PROGRESS NOTES
"Subjective   Reason for Visit: Shalom Horton is an 77 y.o. male here for a Medicare Wellness visit.               HPI  Reveiwed Chronic medical issues  Patient Care Team:  Gian Waller DO as PCP - General  Gian Waller DO as PCP - [employer] PCP     Review of Systems    Objective   Vitals:  /72   Pulse 71   Temp 36.4 °C (97.6 °F)   Ht 1.803 m (5' 11\")   Wt 87.5 kg (193 lb)   SpO2 98%   BMI 26.92 kg/m²       Physical Exam  General: Patient is alert and oriented ×3 and appears in no acute distress. No respiratory distress.    Head: Atraumatic normocephalic.    Eyes: EOMI, PERRLA      Ears: Canals patent without any irritation, tympanic membranes without inflammation, no swelling, normal light reflex.    Nose: Nares patent. Turbinates are not swollen. No discharge.    Mouth: Normal mucosa. Moist. No erythema, exudates, tonsillar enlargement.    Neck: Normal range of motion, no masses.  Thyroid is palpable and normal in size without any nodules. No anterior cervical or posterior cervical adenopathy.    Heart: Regular rate and rhythm, no murmurs clicks or gallops    Lungs: Clear to auscultation bilaterally without any rhonchi rales or wheezing, lung sounds heard throughout all lung fields    Abdomen: Soft, nontender, no rigidity, rebound, guarding or organomegaly. Bowel sounds ×4 quadrants.    Musculoskeletal: Strength grossly intact    Nerves: Cranial nerves II through XII appear grossly intact and without deficit    Skin: Intact, dry, no rashes or erythema    Psych: Normal affect.  Assessment/Plan   Problem List Items Addressed This Visit       Myalgia    Low back pain    Relevant Orders    Referral to Physical Therapy    Prostate cancer (CMS/HCC)     Other Visit Diagnoses       Medicare annual wellness visit, subsequent    -  Primary    Encounter for screening for malignant neoplasm of colon        Relevant Orders    Cologuard® colon cancer screening    Adenocarcinoma, lung, right " (CMS/HCC)            1 metastatic prostate cancer, bone scan did show diffuse bony metastatic   disease which is confirmed by PET scan and PET scan also did show abnormal   metabolic activity in the right lower lobe.       Current treatment Lupron injection and Casodex,  monitor PSA.  Patient is also   going to have palliative radiotherapy to right humerus area        2.  Right lower lobe mass, Adenocarcinoma Lung- Evaluation further and seeing oncology    3. Right humerus.  Fracture that was pathological.  Orthopedics following at [facility]    4. Pain  - Gabapentin increased and having some improvement.   4 x day increase 300 mg  - Getting some pain down to the foot.   - Having right sided pain shooting down the right leg    Reviewed in for the patient's past medical history, surgical history, family history, social history  Depression screening was done in the office today using PHQ-2  We reviewed the patient's activities of daily living and possible risk for falling. Patient is stable.  Discussed preventative screenings  Colonoscopy Is done with Dr. Joyce Zamorano. He is supposed to follow-up every 5 years.   Prostate Cancer is present and tretment with Hector cancer sharon  Vaccinations were discussed in the office. We did review the patient's status on influenza vaccination, pneumonia vaccination, tetanus vaccination, and refused all vaccinations  Patient follows-up with dentist regularly and also with eye doctor regularly  We discussed advanced directives including power of , living well and DO NOT RESUSCITATE orders and he and his wife do have these in place    Knee Pain right- Exercises, PHP Herbalgesic, MSM glucosamine, neoprene sleeeve as needed    Optic nerve atrophy and Glaucoma- Following with eye doc

## 2023-05-01 ENCOUNTER — TELEPHONE (OUTPATIENT)
Dept: PRIMARY CARE | Facility: CLINIC | Age: 78
End: 2023-05-01
Payer: MEDICARE

## 2023-05-01 NOTE — TELEPHONE ENCOUNTER
Marnie(billy's wife) called she is requesting a call back because she is confused on the gabapentin dose, she thought the dose was increasing to 600mg instead of 300mg, he is still having pain.

## 2023-05-02 NOTE — TELEPHONE ENCOUNTER
Called Shalom and left a detailed message as to what Dr. Waller wanted him to do. And asked for a call back

## 2023-05-02 NOTE — TELEPHONE ENCOUNTER
Called Shalom and let him know that this will have to wait til he comes back on wed. Then we will call him and let him know if it has changed

## 2023-05-03 ENCOUNTER — TELEPHONE (OUTPATIENT)
Dept: PRIMARY CARE | Facility: CLINIC | Age: 78
End: 2023-05-03
Payer: MEDICARE

## 2023-05-03 DIAGNOSIS — G89.29 CHRONIC RIGHT-SIDED LOW BACK PAIN WITH RIGHT-SIDED SCIATICA: ICD-10-CM

## 2023-05-03 DIAGNOSIS — M54.41 CHRONIC RIGHT-SIDED LOW BACK PAIN WITH RIGHT-SIDED SCIATICA: ICD-10-CM

## 2023-05-03 DIAGNOSIS — M79.10 MYALGIA: Primary | ICD-10-CM

## 2023-05-03 RX ORDER — GABAPENTIN 600 MG/1
600 TABLET ORAL 3 TIMES DAILY
Qty: 90 TABLET | Refills: 5 | Status: SHIPPED | OUTPATIENT
Start: 2023-05-03 | End: 2023-11-13

## 2023-05-03 NOTE — TELEPHONE ENCOUNTER
Patient's wife called regarding Gabapentin dosing. Wanted to know if he can get a script for 600 mg, possibly 4x daily. If not, would still like script for the 600 mg capsule and will try 3x daily if you are not ok with 4x daily

## 2023-05-04 ENCOUNTER — APPOINTMENT (OUTPATIENT)
Dept: PRIMARY CARE | Facility: CLINIC | Age: 78
End: 2023-05-04
Payer: MEDICARE

## 2023-05-04 LAB
INR IN PPP BY COAGULATION ASSAY: 1 (ref 0.9–1.1)
PROTHROMBIN TIME (PT) IN PPP BY COAGULATION ASSAY: 11.6 SEC (ref 9.8–13.4)

## 2023-05-04 NOTE — TELEPHONE ENCOUNTER
I talked to Shalom's wife and she said he has been taking the Gabapentin 600mg TID, he only has 37 pills left of the 300mg. They couldn't get the new rx at [facility] because of insurance. She thinks you have to cancel that rx and send in a new one. He is going through physical therapy on arm first then they will work on the nerve in the water next. Also needs a suggestion how to stretch it out through the night or can he take something else before going to bed to help keep the pain down during the night.

## 2023-05-13 LAB — NONINV COLON CA DNA+OCC BLD SCRN STL QL: NEGATIVE

## 2023-05-15 ENCOUNTER — TELEPHONE (OUTPATIENT)
Dept: PRIMARY CARE | Facility: CLINIC | Age: 78
End: 2023-05-15
Payer: MEDICARE

## 2023-05-15 NOTE — TELEPHONE ENCOUNTER
----- Message from Gian Waller DO sent at 5/13/2023  6:58 AM EDT -----  Please let the patient know that his Cologuard test was negative

## 2023-05-17 ENCOUNTER — HOSPITAL ENCOUNTER (OUTPATIENT)
Dept: DATA CONVERSION | Facility: HOSPITAL | Age: 78
End: 2023-05-17
Attending: INTERNAL MEDICINE
Payer: MEDICARE

## 2023-05-17 DIAGNOSIS — R73.03 PREDIABETES: ICD-10-CM

## 2023-05-17 DIAGNOSIS — I10 ESSENTIAL (PRIMARY) HYPERTENSION: ICD-10-CM

## 2023-05-17 DIAGNOSIS — K21.9 GASTRO-ESOPHAGEAL REFLUX DISEASE WITHOUT ESOPHAGITIS: ICD-10-CM

## 2023-05-17 DIAGNOSIS — C61 MALIGNANT NEOPLASM OF PROSTATE (MULTI): ICD-10-CM

## 2023-05-17 DIAGNOSIS — C34.90 MALIGNANT NEOPLASM OF UNSPECIFIED PART OF UNSPECIFIED BRONCHUS OR LUNG (MULTI): ICD-10-CM

## 2023-05-17 DIAGNOSIS — C34.31 MALIGNANT NEOPLASM OF LOWER LOBE, RIGHT BRONCHUS OR LUNG (MULTI): ICD-10-CM

## 2023-05-17 DIAGNOSIS — C79.51 SECONDARY MALIGNANT NEOPLASM OF BONE (MULTI): ICD-10-CM

## 2023-05-22 LAB
COMPLETE PATHOLOGY REPORT: NORMAL
CONVERTED CLINICAL DIAGNOSIS-HISTORY: NORMAL
CONVERTED FINAL DIAGNOSIS: NORMAL
CONVERTED FINAL REPORT PDF LINK TO COPY AND PASTE: NORMAL
CONVERTED GROSS DESCRIPTION: NORMAL

## 2023-06-14 LAB
CREATININE (MG/DL) IN SER/PLAS: 0.75 MG/DL (ref 0.5–1.3)
GFR MALE: >90 ML/MIN/1.73M2
UREA NITROGEN (MG/DL) IN SER/PLAS: 20 MG/DL (ref 6–23)

## 2023-08-02 ENCOUNTER — APPOINTMENT (OUTPATIENT)
Dept: PRIMARY CARE | Facility: CLINIC | Age: 78
End: 2023-08-02
Payer: MEDICARE

## 2023-08-02 ENCOUNTER — OFFICE VISIT (OUTPATIENT)
Dept: PRIMARY CARE | Facility: CLINIC | Age: 78
End: 2023-08-02
Payer: MEDICARE

## 2023-08-02 VITALS
HEIGHT: 71 IN | SYSTOLIC BLOOD PRESSURE: 128 MMHG | OXYGEN SATURATION: 97 % | HEART RATE: 63 BPM | DIASTOLIC BLOOD PRESSURE: 80 MMHG | BODY MASS INDEX: 26.74 KG/M2 | WEIGHT: 191 LBS

## 2023-08-02 DIAGNOSIS — C61 PROSTATE CANCER (MULTI): ICD-10-CM

## 2023-08-02 DIAGNOSIS — C34.91 ADENOCARCINOMA, LUNG, RIGHT (MULTI): Primary | ICD-10-CM

## 2023-08-02 PROBLEM — S42.309A HUMERUS FRACTURE: Status: ACTIVE | Noted: 2023-08-02

## 2023-08-02 PROCEDURE — 99214 OFFICE O/P EST MOD 30 MIN: CPT | Performed by: FAMILY MEDICINE

## 2023-08-02 PROCEDURE — 1036F TOBACCO NON-USER: CPT | Performed by: FAMILY MEDICINE

## 2023-08-02 PROCEDURE — 1160F RVW MEDS BY RX/DR IN RCRD: CPT | Performed by: FAMILY MEDICINE

## 2023-08-02 PROCEDURE — 1159F MED LIST DOCD IN RCRD: CPT | Performed by: FAMILY MEDICINE

## 2023-08-02 RX ORDER — MULTIVIT WITH MINERALS/HERBS
1 TABLET ORAL DAILY
COMMUNITY

## 2023-08-02 NOTE — PROGRESS NOTES
Subjective   Patient ID: Shalom Horton is a 77 y.o. male who presents for Follow-up.  HPI  Having pain in the legs.  Seeing PT and insurance had only had   He had lost weight but has regained.  He is working with PT.  Has started working at the gym.  He is getting remapped for lung cancer and radiation.  He has decided to not have the radiation.      He is asking for an xray of the lung in a couple months.  He has had issues with memory after the radiation.        Review of Systems    Objective   Physical Exam    Assessment/Plan   Problem List Items Addressed This Visit       Prostate cancer (CMS/HCC)     Other Visit Diagnoses       Adenocarcinoma, lung, right (CMS/HCC)    -  Primary    Relevant Orders    CT chest wo IV contrast        Medical records from the radiology oncologist and also the hematologist oncologist were reviewed in depth and discussed with the patient.  The patient does not want radiation because he is scared of what he will due to his body.  He does not want to have surgery for lobectomy because he has never had a surgery like this.  We discussed utility of these and how they could be extremely beneficial to him.  We discussed disease course and staging of his lung cancer as well as treatments.  Patient was still resistant to radiation and surgery in the future.  I agreed to order a CT of the chest in 3 months and if there is any progression of his disease at all then he agreed to start radiation.

## 2023-08-03 ENCOUNTER — TELEPHONE (OUTPATIENT)
Dept: PRIMARY CARE | Facility: CLINIC | Age: 78
End: 2023-08-03
Payer: MEDICARE

## 2023-08-03 NOTE — TELEPHONE ENCOUNTER
Patient rec'd bill for 4/27/23 visit and it appears t\it was for a wellness but you also used [id number] which will not be covered with wellness. Code will need removed. I put bill in your bin to review

## 2023-08-15 ENCOUNTER — APPOINTMENT (OUTPATIENT)
Dept: PRIMARY CARE | Facility: CLINIC | Age: 78
End: 2023-08-15
Payer: MEDICARE

## 2023-09-07 VITALS — BODY MASS INDEX: 27.99 KG/M2 | WEIGHT: 178.35 LBS | HEIGHT: 67 IN

## 2023-09-14 NOTE — PROGRESS NOTES
Service: Family Medicine     Subjective Data:   SEVERIANO RUEDA is a 77 year old Male who is Hospital Day # 2.     Reports improvement in pain which is controlled with medication. Feels prepared for surgery, but does not want further information until afterwards. No BM since being in hospital, but has not been eating.    Objective Data:     Objective Information:      T   P  R  BP   MAP  SpO2   Value  36.6  76  15  157/81   106  95%  Date/Time 3/1 4:48 3/1 4:48 3/1 4:48 3/1 4:48  3/1 4:48 3/1 4:48  Range  (36.6C - 37C )  (76 - 95 )  (15 - 18 )  (156 - 174 )/ (81 - 92 )  (106 - 119 )  (92% - 95% )  Highest temp of 37 C was recorded at 2/28 13:54      Pain reported at 3/1 13:15: 0 = None    Physical Exam Narrative:  ·  Physical Exam:    Constitutional: Well developed, NAD, AAOx3, alert and cooperative  Head: NCAT  Mouth: MMM  Respiratory: Patent airways, CTAB, symmetric chest expansion, thorax symmetric  Cardiovascular: Regular, rate and rhythm, no murmurs, normal S 1 and S 2  Upper extremities: R arm in sling, ttp over mid arm, ROM intact at elbow and wrist; sensation intact, radial pulse 2+  Extremities: no visible discoloration, lesions or edema; warm, non-tender; dorsalis pedis pulses 2+, normal bilaterally  Psychological: Appropriate mood and affect    Medication:    Medications:      CENTRAL NERVOUS SYSTEM AGENTS:    1. Acetaminophen:  650  mg  Oral  Every 4 Hours   PRN       2. HYDROmorphone Injectable:  0.2  mg  IntraVenous Push  Every 5 Minutes   PRN       3. HYDROmorphone Injectable:  0.4  mg  IntraVenous Push  Every 5 Minutes   PRN       4. oxyCODONE Immediate Release:  5  mg  Oral  Every 4 Hours   PRN       5. Ondansetron Injectable:  4  mg  IntraVenous Push  Once   PRN         MISCELLANEOUS AGENTS:    1. Naloxone Injectable:  0.2  mg  IntraVenous Push  Once   PRN         NUTRITIONAL PRODUCTS:    1. Lactated Ringers Infusion:  1000  mL  IntraVenous  <Continuous>      RESPIRATORY AGENTS:    1.  Albuterol 2.5 mg/ 3 mL Nebulizer Soln:  3  mL  Inhalation  Once   PRN         Recent Lab Results:    Results:    CBC: 3/1/2023 08:11              \     Hgb     /                              \     9.4 L    /  WBC  ----------------  Plt               5.8       ----------------    311              /     Hct     \                              /     30.9 L    \            RBC: 3.30 L    MCV: 94     Neutrophil %: 63.9      CMP: 3/1/2023 08:11  NA+        Cl-     BUN  /                         137    100    10  /  --------------------------------  Glucose                ---------------------------  286 H    K+     HCO3-   Creat \                         3.5    25    0.54  \           \  T Bili  /                    \  0.4  /  AST  x ---- x ALT        27 x ---- x 14         /  Alk P   \               /  450 H \  Calcium : 8.3 L    Anion Gap : 16     Albumin : 3.1 L     T Protein : 5.2 L          Radiology Results:    Results:        Impression:    Midshaft fracture of the right humerus, possibly pathologic.     Evidence of metastatic disease.        CT Up Extremity without Contrast [Feb 27 2023 12:16PM]      Impression:    Angulated possibly subacute and/or pathologic fracture of the humeral  shaft.     Multiple sclerotic osseous lesions suspicious for metastatic disease.  Nodular interstitial prominence in the right lung field could reflect  additional malignancy. Further evaluation recommended.     Xray Humerus 2 View [Feb 27 2023 11:33AM]      Impression:    Angulated possibly subacute and/or pathologic fracture of the humeral  shaft.     Multiple sclerotic osseous lesions suspicious for metastatic disease.  Nodular interstitial prominence in the right lung field could reflect  additional malignancy. Further evaluation recommended.     Xray Shoulder Complete Min 2 Views [Feb 27 2023 11:33AM]      Assessment and Plan:   Code Status:  ·  Code Status Full Code     Assessment:    Shalom Horton is a 78yo male with a  history of radiular pain x4 extremities d/t degenerative changes of the spine who presented to the ER for RUE pain after fall and was found  to have a likely pathologic fracture of the R humerus. Now admitted for further evaluation and management of likely malignancy and humerus fracture. Patient hypertensive on admission, likely due to pain, otherwise vitals stable. Ortho on board, will likely  need malignancy workup, will consult heme/onc, however, may require outpatient workup.     Updates 3/1:  - OR with ortho pending  - CT c/a/p concerning for primary lung malignancy   - ferritin elevated to 1237  - peripheral smear pending    #R humerus fracture  -ortho consulted  - type and screen, coags 2/27 at OSH  -pain control w/ acetaminophen 650 mg q6h PRN mild pain    -oxycodone 5 mg q4h PRN severe pain    #Metastatic bone disease  -f/up OSH PET-CT if done, if not done order CT chest/abdomen/pelvis  -regarding possible primaries, up to date on colon cancer screening, no documented hx of prostate cancer screening, no smoking history of lung cancer screening  -MRI findings w/ diffuse marrow findings raise concern for possible hematologic malignancy  - CT c/a/p 03/01: 1. Right lower lobe lung mass measuring 3.9 x 2.3 x 3.4 cm with innumerable additional subcentimeter bilateral pulmonary nodules as well as mediastinal and bilateral hilar lymphadenopathy concerning for primary lung malignancy with pulmonary  metastatic disease. Extensive diffuse mixed lytic sclerotic osseous lesions throughout the axial and appendicular skeleton concerning for metastatic disease. Partially imaged right humeral fracture.  2. No acute abnormality within the abdomen or pelvis.  -will consult heme/onc pending ortho plan     #Anemia  - unknown cause  - could be related to potential primary malignancy   - iron studies with elevated ferritin (1237), however iron, TIBC and %sat WNL  - peripheral smear pending    #Glaucoma  - tafluprost 0.0015% - 1  gtt every day at home  - will substitute with formulary latanoprost while admitted     Dispo: Pending clinical course  F: replete as needed  E: replete as needed  N: NPO pending ortho recs  GI: pantoprazole 40 mg daily  DVT: lovenox sq  Code status: full code     Patient seen and discussed with Attending, Dr Jackson Mitchell.   Kacey TOMAS (CAYLA) MD [facility].  PGY-1 Family Medicine  [facility]/[id number]      Attestation:   Note Completion:  I am a:  Resident/Fellow   Attending Attestation I saw and evaluated the patient.  I personally obtained the key and critical portions of the history and physical exam or was physically present for key and  critical portions performed by the resident/fellow. I reviewed the resident/fellow?s documentation and discussed the patient with the resident/fellow.  I agree with the resident/fellow?s medical decision making as documented in the note.     I personally evaluated the patient on 01-Mar-2023         Electronic Signatures:  Kacey Parikh (Resident))  (Signed 01-Mar-2023 15:25)   Authored: Service, Subjective Data, Objective Data, Assessment  and Plan, Note Completion  Jackson Mitchell)  (Signed 02-Mar-2023 15:17)   Authored: Note Completion   Co-Signer: Service, Subjective Data, Objective Data, Assessment and Plan, Note Completion      Last Updated: 02-Mar-2023 15:17 by Jackson Mitchell)

## 2023-09-14 NOTE — PROGRESS NOTES
Service: Family Medicine     Subjective Data:   SEVERIANO RUEDA is a 77 year old Male who is Hospital Day # 3 and POD #1 for 1. ***RIGHT HUMERUS MIDSHAFT NAIL;    Overnight Events: Patient had an uneventful night.   Additional Information:    Pt seen and examined in bed. Pt reports that he was able to sleep through the night. Pt reports that the R arm pain isn't too bad today and that he has not asked  for pain meds since 11pm last night. Denies cough, CP, abd pain, nausea, or vomiting. ROS otherwise negative.    Objective Data:     Objective Information:      T   P  R  BP   MAP  SpO2   Value  37  80  18  142/77   99  95%  Date/Time 3/2 5:30 3/2 7:57 3/2 5:30 3/2 5:30  3/1 18:30 3/2 7:57  Range  (36.6C - 37.4C )  (76 - 91 )  (15 - 18 )  (123 - 157 )/ (74 - 87 )  (99 - 106 )  (92% - 95% )   As of 02-Mar-2023 05:30:00, patient is on 2 L/min of oxygen via room air.  Highest temp of 37.4 C was recorded at 3/1 18:30      Pain reported at 3/2 5:30: 0 = None    Physical Exam Narrative:  ·  Physical Exam:    General: In no apparent distress  HEENT: The head was normocephalic and atraumatic. The mucous membranes were moist, the sclera were white, and conjunctivae pink.  Neck:  There was no cervical lymphadenopathy. The neck veins were not distended.   Respiratory: Clear to auscultation b/l, no wheezing, rales, or rhonchi. Normal work of breathing  Cardiovascular: The heart was regular in rate and rhythm. S1 and S2 were normal. No murmurs, gallops, or rubs.  Gastrointestinal: The abdomen was soft and nontender with normoactive bowel sounds. Abdomen was nondistended. Negative for rebound or guarding.   Extremities: No edema, no cyanosis, skin intact.   Skin: Warm and dry. Negative for ecchymosis or rashes  Psychiatric: The patient was pleasant and cooperative.     Medication:    Medications:          Continuous Medications       --------------------------------  No continuous medications are active       Scheduled  Medications       --------------------------------    1. Docusate:  100  mg  Oral  2 Times a Day    2. Enoxaparin SubCutaneous:  40  mg  SubCutaneous  Every 24 Hours    3. Gabapentin:  100  mg  Oral  2 Times a Day    4. Gadoterate Meglumine ([facility] Contrast):  15.86  mL  IntraVenous Push  Once    5. Gadoterate Meglumine ([facility] Contrast):  15.86  mL  IntraVenous Push  Once    6. Latanoprost 0.005% Ophthalmic:  1  drop(s)  Both Eyes  At Bedtime    7. Omega-3 Acid Ethyl Esters:  1  gram(s)  Oral  Daily    8. Polyethylene Glycol:  17  gram(s)  Oral  Daily         PRN Medications       --------------------------------    1. Acetaminophen:  650  mg  Oral  Every 6 Hours    2. Melatonin:  3  mg  Oral  At Bedtime    3. Naloxone Injectable:  0.2  mg  IntraVenous Push  Once    4. oxyCODONE Immediate Release:  5  mg  Oral  Every 4 Hours    5. Sodium Chloride 0.9% Injectable Flush:  10  mL  IntraVenous Flush  Every 8 Hours and as Needed        Recent Lab Results:    Results:    CBC: 3/2/2023 09:29              \     Hgb     /                              \     8.9 L    /  WBC  ----------------  Plt               7.0       ----------------    356              /     Hct     \                              /     29.2 L    \            RBC: 3.13 L    MCV: 93         Radiology Results:    Results:    Impression:    1. Right lower lobe lung mass measuring 3.9 x 2.3 x 3.4 cm with  innumerable additional subcentimeter bilateral pulmonary nodules as  well as mediastinal and bilateral hilar lymphadenopathy concerning  for primary lung malignancy with pulmonary metastatic disease.  Extensive diffuse mixed lytic sclerotic osseous lesions throughout  the axial and appendicular skeleton concerning for metastatic  disease. Partially imaged right humeral fracture.  2. No acute abnormality within the abdomen or pelvis.    CT Chest Abdomen Pelvis with IV Contrast [Mar  1 2023 11:55AM]      Assessment and Plan:   Code  Status:  ·  Code Status Full Code     Assessment:    Mr. Horton is a 76yo male with a history of radicular pain x4 extremities d/t degenerative changes of the spine who presented to the ER for RUE pain after fall and  was found to have a likely pathologic fracture of the R humerus.    Updates 3/2:  - POD#1 from R humerus IMN and biopsy and ortho has signed off  - Oncology consulted           - MRI brain w/wo contrast    #R humerus fracture  - s/p biopsy and humerus IMN  - pain control w/ acetaminophen 650 mg q6h PRN mild pain    -oxycodone 5 mg q4h PRN severe pain  [ ] f/u biopsy    #Metastatic bone disease  - No PET scan inpatient  - regarding possible primaries, up to date on colon cancer screening, no documented hx of prostate cancer screening, no smoking history of lung cancer screening  - MRI findings w/ diffuse marrow findings raise concern for possible hematologic malignancy  - CT c/a/p 03/01: 1. Right lower lobe lung mass measuring 3.9 x 2.3 x 3.4 cm with innumerable additional subcentimeter bilateral pulmonary nodules as well as mediastinal and bilateral hilar lymphadenopathy concerning for primary lung malignancy with pulmonary  metastatic disease. Extensive diffuse mixed lytic sclerotic osseous lesions throughout the axial and appendicular skeleton concerning for metastatic disease. Partially imaged right humeral fracture.  2. No acute abnormality within the abdomen or pelvis.  - Oncology on board, appreciate recs  - MRI brain w/wo contrast    #Anemia  - unknown cause  - could be related to potential primary malignancy   - iron studies with elevated ferritin (1237), however iron, TIBC and %sat WNL  - peripheral smear pending    #Glaucoma  - tafluprost 0.0015% - 1 gtt every day at home  - will substitute with formulary latanoprost while admitted     Dispo: Pending clinical course  F: replete as needed  E: replete as needed  N: regular  GI: pantoprazole 40 mg daily  DVT: lovenox sq  Code status: full code      Patient discussed with Dr. Mitchell, who also saw and evaluated the patient.     Janis Solorzano, PGY-1  Family Medicine   [facility]     Attestation:   Note Completion:  I am a:  Resident/Fellow   Attending Attestation I saw and evaluated the patient.  I personally obtained the key and critical portions of the history and physical exam or was physically present for key and  critical portions performed by the resident/fellow. I reviewed the resident/fellow?s documentation and discussed the patient with the resident/fellow.  I agree with the resident/fellow?s medical decision making as documented in the note.     I personally evaluated the patient on 02-Mar-2023         Electronic Signatures:  Jackson Mitchell)  (Signed 02-Mar-2023 15:22)   Authored: Note Completion   Co-Signer: Service, Subjective Data, Objective Data, Assessment and Plan, Note Completion  Janis Solorzano (DO (Resident))  (Signed 02-Mar-2023 11:13)   Authored: Service, Subjective Data, Objective Data, Assessment  and Plan, Note Completion      Last Updated: 02-Mar-2023 15:22 by Jackson Mitchell)

## 2023-09-14 NOTE — H&P
History & Physical Reviewed:   I have reviewed the History and Physical dated:  28-Feb-2023   History and Physical reviewed and relevant findings noted. Patient examined to review pertinent physical  findings.: No significant changes   Home Medications Reviewed: no changes noted   Allergies Reviewed: no changes noted       ERAS (Enhanced Recovery After Surgery):  ·  ERAS Patient: no     Consent:   COVID-19 Consent:  ·  COVID-19 Risk Consent Surgeon has reviewed key risks related to the risk of anne COVID-19 and if they contract COVID-19 what the risks are.     Attestation:   Note Completion:  I am a:  Resident/Fellow   Attending Attestation I saw and evaluated the patient.  I personally obtained the key and critical portions of the history and physical exam or was physically present for key and  critical portions performed by the resident/fellow. I reviewed the resident/fellow?s documentation and discussed the patient with the resident/fellow.  I agree with the resident/fellow?s medical decision making as documented in the note.     I personally evaluated the patient on 01-Mar-2023         Electronic Signatures:  Adithya Will)  (Signed 01-Mar-2023 11:16)   Authored: Note Completion   Co-Signer: History & Physical Reviewed, ERAS, Consent, Note Completion  Vitaliy Kaur (Resident))  (Signed 01-Mar-2023 04:22)   Authored: History & Physical Reviewed, ERAS, Consent,  Note Completion      Last Updated: 01-Mar-2023 11:16 by Adithya Will)

## 2023-09-14 NOTE — H&P
History of Present Illness:   HPI:    Shalom Horton is a 76 yo male with a history of severe degenerative changes of the spine causing chronic radicular leg and arm pain, and glaucoma who presented to [facility] ED  on 2/27 after he lost his balance putting on his shoes. He grabbed onto the door to try and stop his fall and heard a pop followed by pain prompting him to present to the ER for evaluation. In the ER he had an XR which showed a fracture of the mid-shaft  of the R humerus as well as sclerotic lesions concerning for metastatic disease. Humerus fracture was concerning for a pathologic fracture. Patient was discussed with ortho who recommended admission to medicine with ortho consult for evaluation and management  of pathologic fracture.     Of note, patient had a MRI of the cervical spine (at [facility] 12/29/2022, report available in ambulatory EMR) at the end of December due to new onset left arm pain, numbness, and tingling. This MRI showed 1. Diffusely decreased signal on T1-weighted  imaging in the visualized bone marrow, which is nonspecific and can be seen with hematopoietic marrow in the setting of anemia, however diffuse marrow replacing process, including neoplastic processes are not excluded. Additionally, there is possible  cortical breakthrough of soft tissue at the C7-T1 level on the left. Correlate for history of primary metastatic disease. Recommend bone scan, PET-CT, or CT of the chest, abdomen, and pelvis for further evaluation. 2. Moderate spinal canal narrowing at  C5-C6 and mild to moderate spinal canal narrowing at C6-C7. Mild spinal canal narrowing at C3-C4 and C4-C5. 3. Otherwise, multilevele degenerative changes over the cervical spine as described, including severe foraminal narrowing at some levels.    His orthopedist ordered a PET-CT for further evaluation which was scheduled for 2/27 (unknown if patient got this prior to going to ER).    ED Course:  Vitals: T 36.6 C, HR  "84, RR 18, /80, O2 96% on RA  Diagnostics:  CBC: 7.7 > 9.5 / 29.3 < 318  BMP: 138 / 4.1 / 102 / 24 / 14 / 0.56 < 135  Ca: 9.1  LFTs: Alb 3.7, Pro 6.6, , ALT 19, AST 46, Bili 0.4  Coags: PT 13.2, INR 1.1  COVID: negative  XR humerus/shoulder: Angulated possibly subacute and/or pathologic fracture of the humeral shaft.  Multiple sclerotic osseous lesions suspicious for metastatic disease. Nodular interstitial prominence in the right lung field could reflect additional malignancy. Further evaluation recommended.  CT UE: Midshaft fracture of the right humerus, possibly pathologic.  Evidence of metastatic disease.    Interventions: morphine 4 mg IVP q3h PRN, Norco x1    PMHx: as above, prediabetes  PSurgHx: BL knee surgeries, heel surgery, appendectomy, eye surgeries  Meds: see reconciled med list  Allergies: beta blockers  Social Hx: denies T/E/D, lives with wife, hx of [employer] service in [facility], worked in [employer] with \"[employer] for several years   FamHx: HTN, CAD, diabetes in sister; bone cancer in an aunt, unspecified cancer in an uncle    PCP: Gian Waller   Code Status: FULL CODE  Emergency Contact: Marnie (Wife - [phone])  Pharmacy: [facility] [place] ([id number])      Social History:   Social History:  Smoking Status never smoker (1)   Alcohol Use denies(1)   Drug Use denies (1)              Allergies:  ·  beta blockers : Unknown    Medications Prior to Admission:     gabapentin 100 mg oral capsule: 1 cap(s) orally 2 times a day  tafluprost 0.0015% ophthalmic solution: 1 drop(s) to LEFT EYE once a day (in the evening)  cholecalciferol 250 mcg (10,000 intl units) oral capsule: 1 cap(s) orally once a day  Vitamin C 1000 mg oral tablet: 1 tab(s) orally once a day  Fish Oil 1000 mg oral capsule: 1 cap(s) orally once a day  Betaine HCL 650m cap(s) orally 3 times a day (with meals).    Objective:     Objective Information:      T   P  R  BP   MAP  SpO2 "   Value  36.8  85  15  174/92   119  94%  Date/Time 2/28 10:26 2/28 10:26 2/28 10:26 2/28 10:26 2/28 10:26 2/28 10:26  Range  (36.8C - 36.8C )  (85 - 85 )  (15 - 15 )  (174 - 174 )/ (92 - 92 )  (119 - 119 )  (94% - 94% )      Pain reported at 2/28 11:15: 8 = Severe    Physical Exam Narrative:  ·  Physical Exam:    Constitutional: Well developed, NAD, AAOx3, alert and cooperative  Head: NCAT  Mouth: MMM  Respiratory: Patent airways, CTAB, symmetric chest expansion, thorax symmetric  Cardiovascular: Regular, rate and rhythm, no murmurs, normal S 1 and S 2  Upper extremities: R arm in sling, ttp over mid arm, ROM intact at elbow and wrist; sensation intact, radial pulse 2+; LUE: swelling and discoloration over proximal forearm at previous IV site; otherwise unremarkable.   Extremities: no visible discoloration, lesions or edema; warm, non-tender; dorsalis pedis pulses 2+, normal bilaterally  Psychological: Appropriate mood and affect    Medications:    Medications:      ALTERNATIVE MEDICINES:    1. Melatonin:  3  mg  Oral  At Bedtime   PRN       2. Omega-3 Acid Ethyl Esters:  1  gram(s)  Oral  Daily      CENTRAL NERVOUS SYSTEM AGENTS:    1. Acetaminophen:  650  mg  Oral  Every 6 Hours   PRN       2. oxyCODONE Immediate Release:  5  mg  Oral  Every 4 Hours   PRN       3. Gabapentin:  100  mg  Oral  2 Times a Day      COAGULATION MODIFIERS:    1. Enoxaparin SubCutaneous:  40  mg  SubCutaneous  Every 24 Hours      GASTROINTESTINAL AGENTS:    1. Docusate:  100  mg  Oral  2 Times a Day      NUTRITIONAL PRODUCTS:    1. Sodium Chloride 0.9% Injectable Flush:  10  mL  IntraVenous Flush  Every 8 Hours and as Needed   PRN         TOPICAL AGENTS:    1. Latanoprost 0.005% Ophthalmic:  1  drop(s)  Both Eyes  At Bedtime      Recent Lab Results:    Results:    CBC: 2/27/2023 14:45              \     Hgb     /                              \     9.5 L    /  WBC  ----------------  Plt               7.7       ----------------    318               /     Hct     \                              /     29.3 L    \            RBC: 3.24 L    MCV: 90     Neutrophil %: 74.0      CMP: 2/27/2023 14:45  NA+        Cl-     BUN  /                         138    102    14  /  --------------------------------  Glucose                ---------------------------  135 H    K+     HCO3-   Creat \                         4.1    24    0.56  \           \  T Bili  /                    \  0.4  /  AST  x ---- x ALT        46 Hx ---- x 19         /  Alk P   \               /  467 H \  Calcium : 9.1     Anion Gap : 16     Albumin : 3.7     T Protein : 6.6           Coagulation: 2/27/2023 14:45  PT  /                    13.2  /  -------<    INR          ----------<      1.1  PTT\                              \                       Radiology Results:    Results:        Impression:    Midshaft fracture of the right humerus, possibly pathologic.     Evidence of metastatic disease.        CT Up Extremity without Contrast [Feb 27 2023 12:16PM]      Impression:    Angulated possibly subacute and/or pathologic fracture of the humeral  shaft.     Multiple sclerotic osseous lesions suspicious for metastatic disease.  Nodular interstitial prominence in the right lung field could reflect  additional malignancy. Further evaluation recommended.     Xray Humerus 2 View [Feb 27 2023 11:33AM]      Impression:    Angulated possibly subacute and/or pathologic fracture of the humeral  shaft.     Multiple sclerotic osseous lesions suspicious for metastatic disease.  Nodular interstitial prominence in the right lung field could reflect  additional malignancy. Further evaluation recommended.     Xray Shoulder Complete Min 2 Views [Feb 27 2023 11:33AM]      Assessment and Plan:   Assessment:    Shalom Horton is a 76yo male with a history of radiular pain x4 extremities d/t degenerative changes of the spine who presented to the ER for RUE pain after fall and was found  to have a likely  pathologic fracture of the R humerus. Now admitted for further evaluation and management of likely malignancy and humerus fracture. Patient hypertensive on admission, likely due to pain, otherwise vitals stable. Ortho on board, will likely  need malignancy workup, will consult heme/onc, however, may require outpatient workup.       #R humerus fracture  -ortho consulted  - type and screen, coags 2/27 at OSH  -pain control w/ acetaminophen 650 mg q6h PRN mild pain    -oxycodone 5 mg q4h PRN severe pain    #Metastatic bone disease  -f/up OSH PET-CT if done, if not done order CT chest/abdomen/pelvis  -regarding possible primaries, up to date on colon cancer screening, no documented hx of prostate cancer screening, no smoking history of lung cancer screening  -MRI findings w/ diffuse marrow findings raise concern for possible hematologic malignancy  -will consult heme/onc pending ortho plan     #Anemia  - unknown cause  - could be related to potential primary malignancy   - order iron studies + peripheral smear    #Glaucoma  - tafluprost 0.0015% - 1 gtt every day at home  - will substitute with formulary latanoprost while admitted     Dispo: Pending clinical course  F: replete as needed  E: replete as needed  N: NPO pending ortho recs  GI: pantoprazole 40 mg daily  DVT: lovenox sq  Code status: full code     Patient seen and discussed with Attending, Dr Jackson Mitchell.   Kacey Parikh MD (MJ).  PGY-1 Family Medicine  [id number]      Attestation:   Note Completion:  I am a:  Resident/Fellow   Attending Attestation I saw and evaluated the patient.  I personally obtained the key and critical portions of the history and physical exam or was physically present for key and  critical portions performed by the resident/fellow. I reviewed the resident/fellow?s documentation and discussed the patient with the resident/fellow.  I agree with the resident/fellow?s medical decision making as documented in the note.     I personally  "evaluated the patient on 2023   Comments/ Additional Findings    FHX: maternal aunt (lifelong smoker)  of \"lung, bone, and blood cancer\".  SHX: 3 years in [employer], never exposed to Agent Orange. 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          Electronic Signatures:  Kacey Parikh (Resident))  (Signed 2023 15:49)   Authored: History of Present Illness, Comorbidities,  Social History, Allergies, Medications Prior to Admission, Objective, Assessment and Plan, Note Completion  Jackson Mitchell)  (Signed 01-Mar-2023 12:59)   Authored: Note Completion   Co-Signer: History of Present Illness, Comorbidities, Social History, Allergies, Medications Prior to Admission, Objective, Assessment  and Plan, Note Completion      Last Updated: 01-Mar-2023 12:59 by Jackson Mitchell)    References:  1.  Data Referenced From \"Provider Note - ED v3\" 2023 11:52   "

## 2023-09-14 NOTE — PROGRESS NOTES
Service: Orthopaedics     Subjective Data:   SEVERIANO RUEDA is a 77 year old Male who is Hospital Day # 3 and POD #1 for 1. ***RIGHT HUMERUS MIDSHAFT NAIL;     No events overnight. Subjectively feeling well. Pain well controlled. Denies fevers, chills, nausea, vomiting, chest pain, shortness of breath, or new numbness/tingling.    Objective Data:     Objective Information:      T   P  R  BP   MAP  SpO2   Value  37  80  18  142/77   99  95%  Date/Time 3/2 5:30 3/2 5:30 3/2 5:30 3/2 5:30  3/1 18:30 3/2 5:30  Range  (36.6C - 37.4C )  (76 - 91 )  (15 - 18 )  (123 - 157 )/ (74 - 87 )  (99 - 106 )  (92% - 95% )   As of 02-Mar-2023 05:30:00, patient is on 2 L/min of oxygen via nasal cannula.  Highest temp of 37.4 C was recorded at 3/1 18:30      Pain reported at 3/2 5:30: 0 = None    Physical Exam Narrative:  ·  Physical Exam:    Constitutional: NAD, resting comfortably in bed  Skin: Warm and dry, no rashes   Eyes: EOMI, clear sclera   ENMT: MMM   HEENT: Neck supple without apparent injury, EOMI, MMM  Respiratory: NWOB on RA   CV: RRR per peripheral pulses, limbs wwp  GI: soft, non-distended   Lymph: No apparent LAD  Neuro: CERRATO spontaneously, CNs II - XII grossly intact   Psych: Appropriate mood and behavior   MSK:    RUE:   -Meplex dressings intact, sling in place   -Motor intact in axillary/AIN/PIN/ulnar distributions  -SILT axillary/radial/median/ulnar distributions  -Hand wwp, 2+ radial pulse, cap refill brisk  -Compartments soft and compressible, no pain with passive stretch of digits.    Recent Lab Results:    Results:    CBC: 3/1/2023 08:11              \     Hgb     /                              \     9.4 L    /  WBC  ----------------  Plt               5.8       ----------------    311              /     Hct     \                              /     30.9 L    \            RBC: 3.30 L    MCV: 94     Neutrophil %: 63.9      CMP: 3/1/2023 08:11  NA+        Cl-     BUN  /                         137    100     10  /  --------------------------------  Glucose                ---------------------------  286 H    K+     HCO3-   Creat \                         3.5    25    0.54  \           \  T Bili  /                    \  0.4  /  AST  x ---- x ALT        27 x ---- x 14         /  Alk P   \               /  450 H \  Calcium : 8.3 L    Anion Gap : 16     Albumin : 3.1 L     T Protein : 5.2 L          Assessment and Plan:   Code Status:  ·  Code Status Full Code     Assessment:    77M with R mid-shaft humerus fracture after low-energy fall, now s/p open biopsy and humeral IMN on 3/1 by Dr. Covarrubias. Intraop path negative for sarcoma.     Recommendations:  - Weight bearing status: NWB RUE in sling  - Antibiotics: 24 hours of perioperative ancef  - F/U OR pathology  - Analgesia per primary  - DVT ppx per primary  - ortho will s/o and follow peripherally while in house    This plan was discussed with Dr. Covarrubias.    Kvng Hurd MD JAQUELINE  Orthopaedic Surgery, PGY-3  p. [phone]    Please page #[phone] on weekends or on weekdays after 6 PM.    Attestation:   Note Completion:  I am a:  Resident/Fellow   Attending Attestation I saw and evaluated the patient.  I personally obtained the key and critical portions of the history and physical exam or was physically present for key and  critical portions performed by the resident/fellow. I reviewed the resident/fellow?s documentation and discussed the patient with the resident/fellow.  I agree with the resident/fellow?s medical decision making as documented in the note.     I personally evaluated the patient on 02-Mar-2023         Electronic Signatures:  Kvng Hurd (Resident))  (Signed 02-Mar-2023 07:21)   Authored: Service, Subjective Data, Objective Data, Assessment  and Plan, Note Completion  Cisco Covarrubias)  (Signed 16-Mar-2023 10:52)   Authored: Note Completion   Co-Signer: Service, Subjective Data, Objective Data, Assessment and Plan, Note  Completion      Last Updated: 16-Mar-2023 10:52 by Cisco Covarrubias)

## 2023-09-14 NOTE — DISCHARGE SUMMARY
Send Summary:   Discharge Summary Providers:  Provider Role Provider Name   · Attending Zack Noel       Note Recipients: Gian Waller,        Discharge:    Summary:   Admission Date: .28-Feb-2023 10:13:00   Discharge Date: 05-Mar-2023   Attending Physician at Discharge: Zack Noel   Admission Reason: Humerus fracture   Final Discharge Diagnoses: Fracture of humerus with  intramedullary nail repair  Lytic lesion of bone on x-ray - biopsy pending  Lung mass on CT scan   Procedures: Date: 01-Mar-2023 17:00:00  Procedure Name: 1. IMN of the right humerus  2. Open biopsy of the right humerus   Condition at Discharge: Poor   Disposition at Discharge: .Home   Vital Signs:        T   P  R  BP   MAP  SpO2   Value  36.9  82  18  131/76   94  95%  Date/Time 3/5 4:25 3/5 4:25 3/5 4:25 3/5 4:25  3/5 4:25 3/5 4:25  Range  (36.9C - 37.3C )  (82 - 89 )  (17 - 20 )  (131 - 167 )/ (73 - 81 )  (94 - 100 )  (92% - 95% )  Highest temp of 37.3 C was recorded at 3/4 21:00    Date:            Weight/Scale Type:  Height:   28-Feb-2023 13:32  79.3  kg / standing  180.1  cm  Physical Exam:    Constitutional: Well developed, NAD, AAOx3, alert and cooperative  Head: NCAT  Eyes: clear sclera  Respiratory: Patent airways, CTAB  Cardiovascular: Regular, rate and rhythm, no murmurs, normal S 1 and S 2  Gastrointestinal: Nondistended, soft, non-tender, no rebound tenderness or guarding, no masses palpable, no organomegaly, +BS  MSK: RUE slinged and flexed at elbow. No visible swelling, no TTP, pain with minimal movement  Psychological: Appropriate mood and behavior   Hospital Course:    Mr. Horton is a 76yo male with a history of radicular pain x4 extremities d/t degenerative changes of the spine who presented to the ER for RUE pain after fall and  was found to have a likely pathologic fracture of the R humerus. He was transferred here from [place] for orthopedic evaluation. X-ray was suspicious for lytic bone lesions, which was  confirmed with CT scan. On admission here, he remained stable. He was  taken to the OR on 3/1 with ortho for repair of the humerus and bone biopsy. CT c/a/p on 2/28 showed a large lung mass with diffuse osseous metastasis. His pain post op was managed with oxycodone 5 mg PRN. Oncology was consulted on 2/3 and recommended  further brain MRI for staging. 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 He did endorse not having a BM for > 1 week (prior to being admitted) and his bowel regimen was increased  to doc-senna BID and miralax BID. He did require an enema the following day, which lead to a resolution on 3/4, although he was asymptomatic and endorsed passing flatus. Patient was not willing to remain in the hospital awaiting the biopsy results. SNF  was recommended to him, but he requested homecare although this was not possible based on where he lived. This was discussed with him and the recommendation to be admitted to a SNF, however, he decided with his wife to pursue outpatient PT/OT. The status  of the biopsy was also discussed with him, as well as the need for a further biopsy if this is not diagnostic, and the possible need for inpatient treatment if small cell lung cancer is diagnosed. However, both he and his wife were in agreement that they  wanted to pursue treatment closer to home, and were provided a list of oncologists by the oncology team.     #R humerus fracture  - s/p biopsy and humerus IMN  - pain control w/ acetaminophen 650 mg q6h PRN mild pain    -oxycodone 5 mg q4h PRN severe pain  [ ] f/u biopsy    #Metastatic bone disease  - No PET scan inpatient  - regarding possible primaries, up to date on colon cancer screening, no documented hx of prostate cancer screening, no smoking history for lung cancer screening  - MRI findings w/ diffuse marrow findings raise concern for possible hematologic malignancy  - CT c/a/p 03/01: 1.  Right lower lobe lung mass measuring 3.9 x 2.3 x 3.4 cm with innumerable additional subcentimeter bilateral pulmonary nodules as well as mediastinal and bilateral hilar lymphadenopathy concerning for primary lung malignancy with pulmonary  metastatic disease. Extensive diffuse mixed lytic sclerotic osseous lesions throughout the axial and appendicular skeleton concerning for metastatic disease. Partially imaged right humeral fracture.  2. No acute abnormality within the abdomen or pelvis.  - MRI brain w/wo contrast w/o evidence of intracranial mets    #Lumbosacral radiculopathy  - started on gabapentin 100 mg BID 2/22  - increase to 100 BID + 200 mg HS    #Anemia  - unknown cause  - could be related to potential primary malignancy   - iron studies with elevated ferritin (1237), however iron, TIBC and %sat WNL  - peripheral smear pending    #Glaucoma  - tafluprost 0.0015% - 1 gtt every day at home  - will substitute with formulary latanoprost while admitted     DAY OF DISCHARGE:    On the day of discharge, the patient was seen and evaluated by the Family Medicine team. Although SNF was recommended to the patient, he did opt to pursue outpatient PT/OT. We did also did advise him that further biopsy and inpatient treatment may be  necessary, but he opted to be discharged home.  There were no significant events overnight.  Vitals were reviewed and within normal   limits. Labs were stable at discharge.  Plan of care for the day included ensuring that the patient had good p.o. intake, was ambulating well, and was stable.    MEDICATION ON DISCHARGE   acetaminophen 325 mg oral tablet 2 tab(s) orally every 6 hours, As Needed for pain      Betaine HCL 650mg 9 cap(s) orally 3 times a day (with meals)      cholecalciferol 250 mcg (10,000 intl units) oral capsule 1 cap(s) orally once a day      Fish Oil 1000 mg oral capsule 1 cap(s) orally once a day      gabapentin 100 mg oral capsule 1 cap(s) orally 2 times a day        gabapentin 100 mg oral capsule 2 cap(s) orally once (at bedtime)      oxyCODONE 5 mg oral tablet 1 tab(s) orally every 6 to 8 hours, As Needed      polyethylene glycol 3350 oral powder for reconstitution 17 gram(s) orally once a day       sennosides-docusate 8.6 mg-50 mg oral tablet 2 tab(s) orally 2 times a day, As Needed       tafluprost 0.0015% ophthalmic solution 1 drop(s) to LEFT EYE once a day (in the evening)      Vitamin C 1000 mg oral tablet 1 tab(s) orally once a day           Discharge Information:    and Continuing Care:   Lab Results - Pending:    Surgical Pathology Drawn at 01-Mar-2023 14:46:00  Protein Electrophoresis, Serum Drawn at 01-Mar-2023 08:11:00  Path Review SPE Drawn at 01-Mar-2023 08:11:00  Radiology Results - Pending: None   Discharge Instructions:    Activity:           activity with assistance.          No pushing, pulling, or lifting objects greater than 5 pounds until follow-up visit.      Nutrition/Diet:           resume normal diet    Additional Orders:           Additional Instructions:   Thank you for allowing the Family Medicine team to participate in your healthcare.  You were admitted to the hospital for a fractured humerus which was repaired by orthopedic surgery. Imaging of your arm revealed lesions suspicious for metastatic cancer, and further imaging revealed a lung mass.   You were treated with surgery and pain medications. Biopsy of one of the lesions on your arm is pending, somebody from our team will call you with the results of this.   We adjusted your gabapentin dose while you were here, take 100 mg (1 tablet) twice a day and 200 mg (2 tablets) at bed time. We will prescribe oxycodone 5 mg for one week until your followup appointments.  Resume all other home medications without a change  Please review the appointment section below to see what follow up visits were arranged for you.     Home Care Certification:           Home Care Agency:   Other (with phone number)           Skilled Disciplines Ordered:   PT,  OT,  Home Health Aide    Home Care Services:           Home Care Skilled Service:   assessment,  Rehab (PT/OT/SP eval and treat)    Follow Up Appointments:    Follow-Up Appointment 01:           Physician/Dept/Service:   PCP          Reason for Referral:   Hospital followup          Call to Schedule in:   2-3 days    Follow-Up Appointment 02:           Physician/Dept/Service:   Orthopedic surgery          Reason for Referral:   Hospital followup - humerus fracture          Call to Schedule in:   2-3 days    Follow-Up Appointment 03:           Physician/Dept/Service:   Oncology          Reason for Referral:   Bone biopsy results, treatment plan          Call to Schedule in:   2-3 days    Discharge Medications: Home Medication   tafluprost 0.0015% ophthalmic solution - 1 drop(s) to LEFT EYE once a day (in the evening)  cholecalciferol 250 mcg (10,000 intl units) oral capsule - 1 cap(s) orally once a day  Vitamin C 1000 mg oral tablet - 1 tab(s) orally once a day  Fish Oil 1000 mg oral capsule - 1 cap(s) orally once a day  Betaine HCL 650mg - 9 cap(s) orally 3 times a day (with meals)  gabapentin 100 mg oral capsule - 1 cap(s) orally 2 times a day   gabapentin 100 mg oral capsule - 2 cap(s) orally once (at bedtime)  polyethylene glycol 3350 oral powder for reconstitution - 17 gram(s) orally once a day      PRN Medication   oxyCODONE 5 mg oral tablet - 1 tab(s) orally every 6 to 8 hours, As Needed  sennosides-docusate 8.6 mg-50 mg oral tablet - 2 tab(s) orally 2 times a day, As Needed   acetaminophen 325 mg oral tablet - 2 tab(s) orally every 6 hours, As Needed for pain     DNR Status:   ·  Code Status Code Status order at time of discharge: Full Code     Attestation:   Note Completion:  I am a:  Resident/Fellow   Attending Attestation I saw and evaluated the patient.  I personally obtained the key and critical portions of the history and physical exam or was physically present  for key and  critical portions performed by the resident/fellow. I reviewed the resident/fellow?s documentation and discussed the patient with the resident/fellow.  I agree with the resident/fellow?s medical decision making as documented in the note.     I personally evaluated the patient on 05-Mar-2023         Electronic Signatures:  Zack Noel)  (Signed 05-Mar-2023 17:43)   Authored: Summary Content, Ongoing Care, Note Completion   Co-Signer: Send Summary, Summary Content, Ongoing Care, DNR Status, Note Completion  Kacey Parikh (Resident))  (Signed 05-Mar-2023 13:29)   Authored: Send Summary, Summary Content, Ongoing Care,  DNR Status, Note Completion      Last Updated: 05-Mar-2023 17:43 by Zack Noel)

## 2023-09-14 NOTE — PROGRESS NOTES
Service: Orthopaedics     Subjective Data:   SEVERIANO RUEDA is a 77 year old Male who is Hospital Day # 2.     NAEON. AFVSS. Pain controlled on current regimen.  Denies any new onset numbness, tingling or weakness. Denies nausea, vomiting, chest pain, dyspnea, or calf tenderness. Denies any fever or chills.    Objective Data:     Objective Information:      T   P  R  BP   MAP  SpO2   Value  36.6  76  15  157/81   106  95%  Date/Time 3/1 4:48 3/1 4:48 3/1 4:48 3/1 4:48  3/1 4:48 3/1 4:48  Range  (36.6C - 37C )  (76 - 95 )  (15 - 18 )  (156 - 174 )/ (81 - 92 )  (106 - 119 )  (92% - 95% )  Highest temp of 37 C was recorded at 2/28 13:54      Pain reported at 3/1 2:13: 8 = Severe    Physical Exam Narrative:  ·  Physical Exam:    Constitutional: NAD, resting comfortably in bed  Skin: Warm and dry, no rashes   Eyes: EOMI, clear sclera   ENMT: MMM   HEENT: Neck supple without apparent injury, EOMI, MMM  Respiratory: NWOB on RA   CV: RRR per peripheral pulses, limbs wwp  GI: soft, non-distended   Lymph: No apparent LAD  Neuro: CERRATO spontaneously, CNs II - XII grossly intact   Psych: Appropriate mood and behavior   MSK:    RUE:   -Skin intact, no deformity, minimal swelling. Compliant with coaptation splint. Tender at site of injury with painful ROM.   -Motor intact in axillary/AIN/PIN/ulnar distributions  -SILT axillary/radial/median/ulnar distributions  -Hand wwp, 2+ radial pulse, cap refill brisk  -Compartments soft and compressible, no pain with passive stretch of digits.    Assessment and Plan:   Code Status:  ·  Code Status Full Code     Advance Care Planning:  Advance Care Planning: Patient didn't wish to or  was unable to provide advance care plan     Assessment:    Injury: R midshaft humerus fx  HPI: 77M (cervical and lumbar radiculopathy, glaucoma, HTN) s/p fall into wall w above. Transferred from [facility]. Currently undergoing workup at [facility] for concern for diffuse metastatic disease to spine.  Also reports 25 lb unintentional weight  loss since 11/2022. On exam, closed, NVI. XR w short oblique fx of R midshaft humerus. CT w subtle cortical lucency at fracture site. Placed in a coaptation splint. Post splint XR w improved alignment.     Plan:   - Recommend workup for mets of unknown origin including CT chest/ abdomen/pelvis, SPEP, UPEP, MRI R humerus  - NPO at midnight for upcoming surgery with orthopedics.  - Admit to medicine, clear for OR today; Appreciate documentation of clearance by primary team  - Please obtain pre-operative labs/studies: T&S, PT/INR, CBC, BMP, COVID, CXR, EKG  - Consented and posted to OR schedule for ORIF R humerus, open biopsy w/ orthopedic surgery on 3/1  - Strict Bedrest, NWB R upper extremity in coaptation splint.   - Pre-operative ABx: None indicated   - No indication for transfusion pre-operatively  - DVT PPx: SCDs, okay for chemoppx per primary    This patient will be followed by ortho trauma team ([facility] preferred):  1st call: Jeffery Kaur, PGY-1 ([id number])  2nd call: Kvng Hurd PGY-3 ([id number])    6pm-6am M-F, holidays, weekends please contact on-call resident @ [phone] w/ urgent questions/concerns.    Jeffery Kaur MD  Orthopedic Surgery, PGY-1  On-call Resident (on call pager [phone]).    Attestation:   Note Completion:  I am a:  Resident/Fellow   Attending Attestation I reviewed the resident/fellow?s documentation and discussed the patient with the resident/fellow.  I agree with the resident/fellow?s medical  decision making as documented in the note.          Electronic Signatures:  Adithya Will)  (Signed 01-Mar-2023 11:20)   Authored: Note Completion   Co-Signer: Service, Subjective Data, Objective Data, Assessment and Plan, Note Completion  Vitaliy Kaur (Resident))  (Signed 01-Mar-2023 06:39)   Authored: Service, Subjective Data, Objective Data, Assessment  and Plan, Note Completion      Last Updated: 01-Mar-2023 11:20 by Adithya Will)

## 2023-09-14 NOTE — PROGRESS NOTES
Service: Family Medicine     Subjective Data:   SEVERIANO RUEDA is a 77 year old Male who is Hospital Day # 4 and POD #2 for 1. ***RIGHT HUMERUS MIDSHAFT NAIL;     Doing well today. Reports decreased pain in arm and tenderness has decreased. Only required one dose during MRI. Also has not had a BM for more than a week. No N/V or abd pain.    Overnight Events: Patient had an uneventful night.   Additional Information:    SNF recommended by PT, patient undecided on SNF or homecare.     Objective Data:     Objective Information:      T   P  R  BP   MAP  SpO2   Value  36.9  88  18  153/81   117  94%  Date/Time 3/3 4:16 3/3 9:02 3/3 4:16 3/3 9:02  3/3 4:16 3/3 4:16  Range  (36C - 37.3C )  (80 - 94 )  (16 - 18 )  (142 - 170 )/ (77 - 90 )  (106 - 117 )  (93% - 95% )   As of 02-Mar-2023 05:30:00, patient is on 2 L/min of oxygen via room air.  Highest temp of 37.3 C was recorded at 3/2 19:56      Pain reported at 3/3 8:58: 0 = None    Physical Exam Narrative:  ·  Physical Exam:    Constitutional: Well developed, NAD, AAOx3, alert and cooperative  HEENT: NCAT  Mouth: MMM  Cardiovascular: Regular, rate and rhythm, no murmurs, normal S 1 and S 2  Gastrointestinal: Nondistended, soft, non-tender, no rebound tenderness or guarding, no masses palpable, no organomegaly, +BS  MSK: right arm in sling    Medication:    Medications:      ALTERNATIVE MEDICINES:    1. Melatonin:  3  mg  Oral  At Bedtime   PRN       2. Omega-3 Acid Ethyl Esters:  1  gram(s)  Oral  Daily      CENTRAL NERVOUS SYSTEM AGENTS:    1. Acetaminophen:  650  mg  Oral  Every 6 Hours   PRN       2. oxyCODONE Immediate Release:  5  mg  Oral  Every 4 Hours   PRN       3. Gabapentin:  100  mg  Oral  2 Times a Day      COAGULATION MODIFIERS:    1. Enoxaparin SubCutaneous:  40  mg  SubCutaneous  Every 24 Hours      GASTROINTESTINAL AGENTS:    1. Docusate 50 mg - Senna 8.6 m  tablet(s)  Oral  2 Times a Day    2. Polyethylene Glycol:  17  gram(s)  Oral  2 Times a  Day      MISCELLANEOUS AGENTS:    1. Naloxone Injectable:  0.2  mg  IntraVenous Push  Once   PRN         NUTRITIONAL PRODUCTS:    1. Sodium Chloride 0.9% Injectable Flush:  10  mL  IntraVenous Flush  Every 8 Hours and as Needed   PRN         RADIOLOGIC AGENTS:    1. Gadoterate Meglumine ([facility]iology Contrast):  15.86  mL  IntraVenous Push  Once    2. Gadoterate Meglumine ([facility]iology Contrast):  15.86  mL  IntraVenous Push  Once      TOPICAL AGENTS:    1. Latanoprost 0.005% Ophthalmic:  1  drop(s)  Both Eyes  At Bedtime      Recent Lab Results:    Results:    CBC: 3/3/2023 08:07              \     Hgb     /                              \     8.6 L    /  WBC  ----------------  Plt               6.3       ----------------    343              /     Hct     \                              /     27.7 L    \            RBC: 2.99 L    MCV: 93           CMP: 3/3/2023 08:07  NA+        Cl-     BUN  /                         139    101    11  /  --------------------------------  Glucose                ---------------------------  105 H    K+     HCO3-   Creat \                         4.1    28    0.66  \           \  T Bili  /                    \  0.5  /  AST  x ---- x ALT        27 x ---- x 14         /  Alk P   \               /  401 H \  Calcium : 8.2 L    Anion Gap : 14     Albumin : 2.9 L     T Protein : 4.8 L          Radiology Results:    Results:    Impression:    1. Right lower lobe lung mass measuring 3.9 x 2.3 x 3.4 cm with  innumerable additional subcentimeter bilateral pulmonary nodules as  well as mediastinal and bilateral hilar lymphadenopathy concerning  for primary lung malignancy with pulmonary metastatic disease.  Extensive diffuse mixed lytic sclerotic osseous lesions throughout  the axial and appendicular skeleton concerning for metastatic  disease. Partially imaged right humeral fracture.  2. No acute abnormality within the abdomen or pelvis.    CT Chest Abdomen Pelvis with IV  Contrast [Mar  1 2023 11:55AM]      Assessment and Plan:   Code Status:  ·  Code Status Full Code     Assessment:    Mr. Horton is a 76yo male with a history of radicular pain x4 extremities d/t degenerative changes of the spine who presented to the ER for RUE pain after fall and  was found to have a likely pathologic fracture of the R humerus.    Updates 3/3:  - MRI brain official read pending  - Urine M protein elevated, may need repeat   - bone biopsy pending  - patient recommended for SNF, can likely d/c following brain MRI read, pending dispo planning    #R humerus fracture  - s/p biopsy and humerus IMN  - pain control w/ acetaminophen 650 mg q6h PRN mild pain    -oxycodone 5 mg q4h PRN severe pain  [ ] f/u biopsy    #Metastatic bone disease  - No PET scan inpatient  - regarding possible primaries, up to date on colon cancer screening, no documented hx of prostate cancer screening, no smoking history of lung cancer screening  - MRI findings w/ diffuse marrow findings raise concern for possible hematologic malignancy  - CT c/a/p 03/01: 1. Right lower lobe lung mass measuring 3.9 x 2.3 x 3.4 cm with innumerable additional subcentimeter bilateral pulmonary nodules as well as mediastinal and bilateral hilar lymphadenopathy concerning for primary lung malignancy with pulmonary  metastatic disease. Extensive diffuse mixed lytic sclerotic osseous lesions throughout the axial and appendicular skeleton concerning for metastatic disease. Partially imaged right humeral fracture.  2. No acute abnormality within the abdomen or pelvis.  - Oncology on board, appreciate recs  - MRI brain w/wo contrast    #Anemia  - unknown cause  - could be related to potential primary malignancy   - iron studies with elevated ferritin (1237), however iron, TIBC and %sat WNL  - peripheral smear pending    #Glaucoma  - tafluprost 0.0015% - 1 gtt every day at home  - will substitute with formulary latanoprost while admitted     Dispo: PT/OT  recommend SNF  F: replete as needed  E: replete as needed  N: regular  GI: pantoprazole 40 mg daily  DVT: lovenox sq  Code status: full code     Patient seen and discussed with Attending, Dr Zack Noel.   Kacey HOUSER) MD [place].  PGY-1 Family Medicine  DOCHALO/[id number].      Attestation:   Note Completion:  I am a:  Resident/Fellow   Attending Attestation I saw and evaluated the patient.  I personally obtained the key and critical portions of the history and physical exam or was physically present for key and  critical portions performed by the resident/fellow. I reviewed the resident/fellow?s documentation and discussed the patient with the resident/fellow.  I agree with the resident/fellow?s medical decision making as documented in their note  with the exception/addition of the following:    I personally evaluated the patient on 03-Mar-2023   Comments/ Additional Findings    Pt resting comfortably in bed. Bone bx still pending. Brain MRI negative for metastatic disease.           Electronic Signatures:  Zack Noel)  (Signed 03-Mar-2023 17:04)   Authored: Note Completion   Co-Signer: Service, Subjective Data, Objective Data, Assessment and Plan, Note Completion  Kacey Parikh (Resident))  (Signed 03-Mar-2023 11:56)   Authored: Service, Subjective Data, Objective Data, Assessment  and Plan, Note Completion      Last Updated: 03-Mar-2023 17:04 by Zack Noel)

## 2023-09-14 NOTE — PROGRESS NOTES
Service: Family Medicine     Subjective Data:   SEVERIANO RUEDA is a 77 year old Male who is Hospital Day # 5 and POD #3 for 1. ***RIGHT HUMERUS MIDSHAFT NAIL;     Pain in arm is controlled when at rest, however endorses recurrence of BLE radicular pain. Still has not had a BM. No N/V, abdominal pain, or fever.    Objective Data:     Objective Information:      T   P  R  BP   MAP  SpO2   Value  37  89  17  163/81   100  95%  Date/Time 3/4 13:13 3/4 13:13 3/4 13:13 3/4 13:13  3/4 4:25 3/4 13:13  Range  (36.9C - 37.2C )  (84 - 89 )  (16 - 18 )  (148 - 170 )/ (74 - 90 )  (100 - 117 )  (92% - 95% )  Highest temp of 37.2 C was recorded at 3/3 14:09      Pain reported at 3/4 13:13: 0 = None    Physical Exam Narrative:  ·  Physical Exam:    Constitutional: Well developed, NAD, AAOx3, alert and cooperative  HEENT: NCAT  Mouth: MMM  Cardiovascular: Regular, rate and rhythm, no murmurs, normal S 1 and S 2  Gastrointestinal: Nondistended, soft, non-tender, no rebound tenderness or guarding, no masses palpable, no organomegaly, +BS  MSK: right arm in sling    Medication:    Medications:      ALTERNATIVE MEDICINES:    1. Melatonin:  3  mg  Oral  At Bedtime   PRN       2. Omega-3 Acid Ethyl Esters:  1  gram(s)  Oral  Daily      CENTRAL NERVOUS SYSTEM AGENTS:    1. Acetaminophen:  650  mg  Oral  Every 6 Hours   PRN       2. oxyCODONE Immediate Release:  5  mg  Oral  Every 4 Hours   PRN       3. Gabapentin:  100  mg  Oral  <User Schedule>    4. Gabapentin:  200  mg  Oral  At Bedtime      COAGULATION MODIFIERS:    1. Enoxaparin SubCutaneous:  40  mg  SubCutaneous  Every 24 Hours      GASTROINTESTINAL AGENTS:    1. Docusate 50 mg - Senna 8.6 m  tablet(s)  Oral  2 Times a Day    2. Polyethylene Glycol:  17  gram(s)  Oral  2 Times a Day      MISCELLANEOUS AGENTS:    1. Naloxone Injectable:  0.2  mg  IntraVenous Push  Once   PRN         NUTRITIONAL PRODUCTS:    1. Sodium Chloride 0.9% Injectable Flush:  10  mL  IntraVenous  Flush  Every 8 Hours and as Needed   PRN         RADIOLOGIC AGENTS:    1. Gadoterate Meglumine ([facility]iology Contrast):  15.86  mL  IntraVenous Push  Once    2. Gadoterate Meglumine (Dotarem-Radiology Contrast):  15.86  mL  IntraVenous Push  Once      TOPICAL AGENTS:    1. Latanoprost 0.005% Ophthalmic:  1  drop(s)  Both Eyes  At Bedtime      Recent Lab Results:    Results:    CBC: 3/4/2023 12:16              \     Hgb     /                              \     9.7 L    /  WBC  ----------------  Plt               6.2       ----------------    387              /     Hct     \                              /     31.3 L    \            RBC: 3.41 L    MCV: 92     Neutrophil %: 65.7      CMP: 3/4/2023 12:16  NA+        Cl-     BUN  /                         137    99    14  /  --------------------------------  Glucose                ---------------------------  136 H    K+     HCO3-   Creat \                         4.2    28    0.59  \           \  T Bili  /                    \  0.6  /  AST  x ---- x ALT        31 x ---- x 23         /  Alk P   \               /  436 H \  Calcium : 8.6     Anion Gap : 14     Albumin : 3.4     T Protein : 5.5 L          Radiology Results:    Results:        Impression:    1. No intracranial metastatic disease.  2. Marrow signal changes in the clivus which may represent osseous  metastatic disease involvement without focal mass, expansion or  collapse.  3. Nonspecific cerebral white matter changes which are likely  attributable to a moderate burden of chronic small vessel ischemic  changes, similar to prior MRI.  4. Unchanged 0.4 cm hypo enhancing focus in the pituitary gland which  may represent a Rathke's cleft cyst or less likely a microadenoma.      MRI Brain w/wo Contrast [Mar  3 2023  3:34PM]      Assessment and Plan:   Code Status:  ·  Code Status Full Code     Assessment:    Mr. Horton is a 78yo male with a history of radicular pain x4 extremities d/t degenerative  changes of the spine who presented to the ER for RUE pain after fall and  was found to have a likely pathologic fracture of the R humerus.    Updates 3/4:  - MRI brain w/o evidence of intracranial mets, however suspicion for osseous mets to base of skull   - bone biopsy pending  - homecare supplies and approval pending  - bone biopsy pending, but patient unwilling to remain here for further treatment due to distance from home   - increase gabapentin to 100 BID + 200 HS    #R humerus fracture  - s/p biopsy and humerus IMN  - pain control w/ acetaminophen 650 mg q6h PRN mild pain    -oxycodone 5 mg q4h PRN severe pain  [ ] f/u biopsy    #Metastatic bone disease  - No PET scan inpatient  - regarding possible primaries, up to date on colon cancer screening, no documented hx of prostate cancer screening, no smoking history of lung cancer screening  - MRI findings w/ diffuse marrow findings raise concern for possible hematologic malignancy  - CT c/a/p 03/01: 1. Right lower lobe lung mass measuring 3.9 x 2.3 x 3.4 cm with innumerable additional subcentimeter bilateral pulmonary nodules as well as mediastinal and bilateral hilar lymphadenopathy concerning for primary lung malignancy with pulmonary  metastatic disease. Extensive diffuse mixed lytic sclerotic osseous lesions throughout the axial and appendicular skeleton concerning for metastatic disease. Partially imaged right humeral fracture.  2. No acute abnormality within the abdomen or pelvis.  - Oncology on board, appreciate recs  - MRI brain w/wo contrast w/o evidence of intracranial mets    #Lumbosacral radiculopathy  - started on gabapentin 100 mg BID 2/22  - increase to 100 BID + 200 mg HS    #Anemia  - unknown cause  - could be related to potential primary malignancy   - iron studies with elevated ferritin (1237), however iron, TIBC and %sat WNL  - peripheral smear pending    #Glaucoma  - tafluprost 0.0015% - 1 gtt every day at home  - will substitute with  formulary latanoprost while admitted     Dispo: PT/OT recommend SNF  F: replete as needed  E: replete as needed  N: regular  GI: pantoprazole 40 mg daily  DVT: lovenox sq  Code status: full code     Patient seen and discussed with Attending, Dr Zack Noel.   Kacey Parikh MD (MJ).  PGY-1 Family Medicine  [facility]/[id number].      Attestation:   Note Completion:  I am a:  Resident/Fellow   Attending Attestation I saw and evaluated the patient.  I personally obtained the key and critical portions of the history and physical exam or was physically present for key and  critical portions performed by the resident/fellow. I reviewed the resident/fellow?s documentation and discussed the patient with the resident/fellow.  I agree with the resident/fellow?s medical decision making as documented in their note  with the exception/addition of the following:    I personally evaluated the patient on 04-Mar-2023   Comments/ Additional Findings    No BM in a week, will try enema. Working on getting home DME, since pt is declining SNF.           Electronic Signatures:  Zack Noel)  (Signed 04-Mar-2023 15:35)   Authored: Note Completion   Co-Signer: Service, Subjective Data, Objective Data, Assessment and Plan, Note Completion  Kacey Parikh (Resident))  (Signed 04-Mar-2023 14:16)   Authored: Service, Subjective Data, Objective Data, Assessment  and Plan, Note Completion      Last Updated: 04-Mar-2023 15:35 by Zack Noel)

## 2023-10-02 NOTE — OP NOTE
PROCEDURE DETAILS    Preoperative Diagnosis:  1. Pathologic fracture of the right humerus    Postoperative Diagnosis:  1. Pathologic fracture of the right humerus    Surgeon: Cisco Covarrubias  Resident/Fellow/Other Assistant: Kvng Hurd Maschari PA-C    Procedure:  1. IMN of the right humerus  2. Open biopsy of the right humerus  3.  Right humerus pathologic lesion curettage    Anesthesia: Pamela Busch  Estimated Blood Loss: 50  Findings: consistent with preoperative imaging  Specimens(s) Collected: yes,  R humerus   Complications: none  Urine Output: n/a  Drains and/or Catheters: n/a  Implants: [place] T2 humerus nail 9 mm  Tourniquet Times: n/a  Additional Details: NWB RUE, gentle pendulum exercises only. ROM of the wrist and elbow   May remove sling for hygiene, continue use for support  24hrs Ancef  No DVT ppx needed from orthopedic standpoint  Follow up with Dr. Covarrubias/David Mcintyre PA-C in 2 weeks  Patient Returned To/Condition: PACU/stable condition        Operative Report:   Indication  Patient is a pleasant 77 years of male who presents after he sustained a right humerus fracture.  Radiograph was consistent and concerning for pathologic fracture.  We will obtain CT scan of the chest abdomen and pelvis that revealed a lung mass.  We  discussed treatment option with patient.  Patient has no known primary.  Given his age and the lung mass this is likely a metastatic adenocarcinoma.  We discuss surgical treatment with open biopsy for frozen section to confirm that this is not a primary  bone tumor and stabilization of his arm.  We discussed risk-benefit and alternatives patient understood risk of surgery including but not limited to soft tissue complication, infection, nonunion, fixation failure.  I answered all of his questions and  concerns.    Procedure  We proceeded to the operating room.  Appropriate timeout was performed.  General anesthesia was performed by the anesthesia  team.  He was transferred to operative table placed in the supine position with all bony prominences adequately padded.  Patient  received preoperative antibiotics.  The arm was prepped and draped in usual sterile fashion.  We localized his fracture site and made an incision directly over the lateral aspect of the arm.  We dissected down through the soft tissue.  The location of  the fracture was essentially subcutaneous with insertion of the deltoid.  The fracture was encountered.  Patient have evidence of a metastatic lesion.  We resected portion of the bone and sent it to pathology for frozen.  After 2 different samples were  sent the pathology is deemed this to be consistent with adenocarcinoma.  At this point we elected to proceed with debulking the tumor by curetting out the canal and the periosteum of the metastatic lesion.  We had good healthy bone after resection and  curettage.  We copiously irrigated the wound.  We used a pointed reduction forceps to anatomically reduce and clamp the fracture.  We made an incision over the edge of the acromion.  We then split rotator cuff musculotendinous junction along its fibers.   Retractors were placed.  The starting guidewire was used to establish a starting point.  This was confirmed using biplanar fluoroscopy.  Using the protective sleeve the opening reamer was used to open the canal.  A ball-tipped guidewire was passed.   We measured for the length of the nail.  We began reaming reamed to a size 10.  We then inserted the appropriate length nail over the guidewire and impacted into place across the fracture where the fracture was held anatomically reduced.  Using the jig  and the nail 2 proximal interlock screws were inserted.  Distally using the perfect [place] technique we able to localize anterior to posterior interlock distally.  The drill bit was then inserted and a distal interlock screw was then inserted.  Reduction  forceps was removed.  We took the arm  through range of motion and was stable.  Once the jig was removed we repaired the rotator cuff split.  We copiously irrigated the wound.  Vancomycin powder to the wound.  We closed the wound in layered fashion.  Sterile  dressing was applied.  Patient was then placed in a sling for comfort.  He was then awoken from general esthesia transferred to PACU in stable condition without any complication.    David Mcintyre PA-C was my primary assistant for this procedure.  No available resident was available to assist with this procedure.  He assisted with all aspects of the procedure including patient positioning, surgical exposure, obtaining and maintaining  fracture reduction and fixation and wound closure.       Plan  Patient will be weightbearing as tolerated and use his arm for activities as tolerated.  Recommend 24 hours of perioperative antibiotics.  Patient  may receive radiation treatment to the metastatic/fracture wound bed in about 4 weeks once the wound is completely healed if indicated as directed by his oncologic team.  Patient will follow-up in 2 weeks in clinic we will obtain x-ray of the right humerus  2 views.                        Attestation:   Note Completion:  Attending Only - Shared Visit with Advanced Practice Provider This is a shared visit.  I have reviewed the Advanced Practice Provider?s encounter note, approve the Advanced Practice Provider?s documentation,  and provide the following additional information from my personal encounter.    Comments/ Additional Findings    I agree    I am a: Advanced Practice Provider   Attending Attestation I was present for the entire procedure          Electronic Signatures:  David Mcintyre (PA (PHYSICIAN))  (Signed 01-Mar-2023 17:19)   Authored: Post-Operative Note, Chart Review, Note Completion  Cisco Covarrubias)  (Signed 08-Mar-2023 18:21)   Authored: Post-Operative Note, Chart Review, Note Completion   Co-Signer: Post-Operative Note, Chart Review,  Note Completion      Last Updated: 08-Mar-2023 18:21 by Cisco Covarrubias)

## 2023-10-09 ENCOUNTER — TELEPHONE (OUTPATIENT)
Dept: HEMATOLOGY/ONCOLOGY | Facility: CLINIC | Age: 78
End: 2023-10-09

## 2023-10-09 DIAGNOSIS — C61 PROSTATE CANCER (MULTI): ICD-10-CM

## 2023-10-09 RX ORDER — BICALUTAMIDE 50 MG/1
50 TABLET, FILM COATED ORAL DAILY
Qty: 30 TABLET | Refills: 3 | Status: SHIPPED | OUTPATIENT
Start: 2023-10-09 | End: 2024-02-13 | Stop reason: SDUPTHER

## 2023-10-16 ENCOUNTER — HOSPITAL ENCOUNTER (OUTPATIENT)
Dept: RADIOLOGY | Facility: HOSPITAL | Age: 78
Discharge: HOME | End: 2023-10-16
Payer: MEDICARE

## 2023-10-16 DIAGNOSIS — C34.91 ADENOCARCINOMA, LUNG, RIGHT (MULTI): ICD-10-CM

## 2023-10-16 PROCEDURE — 71250 CT THORAX DX C-: CPT | Performed by: RADIOLOGY

## 2023-10-16 PROCEDURE — 71250 CT THORAX DX C-: CPT | Mod: ME

## 2023-11-12 DIAGNOSIS — G89.29 CHRONIC RIGHT-SIDED LOW BACK PAIN WITH RIGHT-SIDED SCIATICA: ICD-10-CM

## 2023-11-12 DIAGNOSIS — M79.10 MYALGIA: ICD-10-CM

## 2023-11-12 DIAGNOSIS — M54.41 CHRONIC RIGHT-SIDED LOW BACK PAIN WITH RIGHT-SIDED SCIATICA: ICD-10-CM

## 2023-11-13 RX ORDER — GABAPENTIN 600 MG/1
600 TABLET ORAL 3 TIMES DAILY
Qty: 90 TABLET | Refills: 0 | Status: SHIPPED | OUTPATIENT
Start: 2023-11-13 | End: 2023-12-11

## 2023-11-15 ENCOUNTER — TELEPHONE (OUTPATIENT)
Dept: PRIMARY CARE | Facility: CLINIC | Age: 78
End: 2023-11-15
Payer: MEDICARE

## 2023-11-15 NOTE — TELEPHONE ENCOUNTER
Shalom had CT Scan on Oct 16th and has not heard from us as to what it showed and what the next step would be.  It is in his chart if you could review and have one of the Medical Assistant call him.

## 2023-12-04 DIAGNOSIS — C61 PROSTATE CANCER (MULTI): Primary | ICD-10-CM

## 2023-12-04 RX ORDER — EPINEPHRINE 0.3 MG/.3ML
0.3 INJECTION SUBCUTANEOUS EVERY 5 MIN PRN
Status: CANCELLED | OUTPATIENT
Start: 2023-12-06

## 2023-12-04 RX ORDER — ALBUTEROL SULFATE 0.83 MG/ML
3 SOLUTION RESPIRATORY (INHALATION) AS NEEDED
Status: CANCELLED | OUTPATIENT
Start: 2023-12-06

## 2023-12-04 RX ORDER — DIPHENHYDRAMINE HYDROCHLORIDE 50 MG/ML
50 INJECTION INTRAMUSCULAR; INTRAVENOUS AS NEEDED
Status: CANCELLED | OUTPATIENT
Start: 2023-12-06

## 2023-12-04 RX ORDER — FAMOTIDINE 10 MG/ML
20 INJECTION INTRAVENOUS ONCE AS NEEDED
Status: CANCELLED | OUTPATIENT
Start: 2023-12-06

## 2023-12-05 DIAGNOSIS — C34.91 NON-SMALL CELL CANCER OF RIGHT LUNG (MULTI): ICD-10-CM

## 2023-12-05 DIAGNOSIS — C61 PROSTATE CANCER (MULTI): Primary | ICD-10-CM

## 2023-12-06 ENCOUNTER — OFFICE VISIT (OUTPATIENT)
Dept: HEMATOLOGY/ONCOLOGY | Facility: CLINIC | Age: 78
End: 2023-12-06
Payer: MEDICARE

## 2023-12-06 ENCOUNTER — INFUSION (OUTPATIENT)
Dept: HEMATOLOGY/ONCOLOGY | Facility: CLINIC | Age: 78
End: 2023-12-06
Payer: MEDICARE

## 2023-12-06 ENCOUNTER — APPOINTMENT (OUTPATIENT)
Dept: LAB | Facility: HOSPITAL | Age: 78
End: 2023-12-06
Payer: MEDICARE

## 2023-12-06 VITALS
HEART RATE: 69 BPM | HEIGHT: 70 IN | TEMPERATURE: 98.2 F | RESPIRATION RATE: 16 BRPM | BODY MASS INDEX: 27.84 KG/M2 | SYSTOLIC BLOOD PRESSURE: 146 MMHG | WEIGHT: 194.45 LBS | DIASTOLIC BLOOD PRESSURE: 77 MMHG | OXYGEN SATURATION: 96 %

## 2023-12-06 DIAGNOSIS — C61 PROSTATE CANCER (MULTI): ICD-10-CM

## 2023-12-06 DIAGNOSIS — C34.91 NON-SMALL CELL CANCER OF RIGHT LUNG (MULTI): ICD-10-CM

## 2023-12-06 LAB
ALBUMIN SERPL BCP-MCNC: 4.1 G/DL (ref 3.4–5)
ALP SERPL-CCNC: 99 U/L (ref 33–136)
ALT SERPL W P-5'-P-CCNC: 13 U/L (ref 10–52)
ANION GAP SERPL CALC-SCNC: 9 MMOL/L (ref 10–20)
AST SERPL W P-5'-P-CCNC: 20 U/L (ref 9–39)
BASOPHILS # BLD AUTO: 0.02 X10*3/UL (ref 0–0.1)
BASOPHILS NFR BLD AUTO: 0.7 %
BILIRUB SERPL-MCNC: 0.4 MG/DL (ref 0–1.2)
BUN SERPL-MCNC: 14 MG/DL (ref 6–23)
CALCIUM SERPL-MCNC: 9.3 MG/DL (ref 8.6–10.3)
CHLORIDE SERPL-SCNC: 107 MMOL/L (ref 98–107)
CO2 SERPL-SCNC: 26 MMOL/L (ref 21–32)
CREAT SERPL-MCNC: 0.79 MG/DL (ref 0.5–1.3)
EOSINOPHIL # BLD AUTO: 0.07 X10*3/UL (ref 0–0.4)
EOSINOPHIL NFR BLD AUTO: 2.5 %
ERYTHROCYTE [DISTWIDTH] IN BLOOD BY AUTOMATED COUNT: 13.9 % (ref 11.5–14.5)
GFR SERPL CREATININE-BSD FRML MDRD: 90 ML/MIN/1.73M*2
GLUCOSE SERPL-MCNC: 102 MG/DL (ref 74–99)
HCT VFR BLD AUTO: 35.3 % (ref 41–52)
HGB BLD-MCNC: 11.5 G/DL (ref 13.5–17.5)
IMM GRANULOCYTES # BLD AUTO: 0 X10*3/UL (ref 0–0.5)
IMM GRANULOCYTES NFR BLD AUTO: 0 % (ref 0–0.9)
LYMPHOCYTES # BLD AUTO: 0.65 X10*3/UL (ref 0.8–3)
LYMPHOCYTES NFR BLD AUTO: 23.2 %
MCH RBC QN AUTO: 31.1 PG (ref 26–34)
MCHC RBC AUTO-ENTMCNC: 32.6 G/DL (ref 32–36)
MCV RBC AUTO: 95 FL (ref 80–100)
MONOCYTES # BLD AUTO: 0.31 X10*3/UL (ref 0.05–0.8)
MONOCYTES NFR BLD AUTO: 11.1 %
NEUTROPHILS # BLD AUTO: 1.75 X10*3/UL (ref 1.6–5.5)
NEUTROPHILS NFR BLD AUTO: 62.5 %
PLATELET # BLD AUTO: 220 X10*3/UL (ref 150–450)
POTASSIUM SERPL-SCNC: 4.1 MMOL/L (ref 3.5–5.3)
PROT SERPL-MCNC: 6.4 G/DL (ref 6.4–8.2)
PSA SERPL-MCNC: 31.07 NG/ML
RBC # BLD AUTO: 3.7 X10*6/UL (ref 4.5–5.9)
SODIUM SERPL-SCNC: 138 MMOL/L (ref 136–145)
WBC # BLD AUTO: 2.8 X10*3/UL (ref 4.4–11.3)

## 2023-12-06 PROCEDURE — 1159F MED LIST DOCD IN RCRD: CPT | Performed by: INTERNAL MEDICINE

## 2023-12-06 PROCEDURE — 85025 COMPLETE CBC W/AUTO DIFF WBC: CPT | Performed by: INTERNAL MEDICINE

## 2023-12-06 PROCEDURE — 84153 ASSAY OF PSA TOTAL: CPT | Performed by: INTERNAL MEDICINE

## 2023-12-06 PROCEDURE — 36415 COLL VENOUS BLD VENIPUNCTURE: CPT | Performed by: INTERNAL MEDICINE

## 2023-12-06 PROCEDURE — 99213 OFFICE O/P EST LOW 20 MIN: CPT | Performed by: INTERNAL MEDICINE

## 2023-12-06 PROCEDURE — 1160F RVW MEDS BY RX/DR IN RCRD: CPT | Performed by: INTERNAL MEDICINE

## 2023-12-06 PROCEDURE — 96372 THER/PROPH/DIAG INJ SC/IM: CPT | Performed by: INTERNAL MEDICINE

## 2023-12-06 PROCEDURE — 2500000004 HC RX 250 GENERAL PHARMACY W/ HCPCS (ALT 636 FOR OP/ED): Mod: JZ,JG | Performed by: INTERNAL MEDICINE

## 2023-12-06 PROCEDURE — 1126F AMNT PAIN NOTED NONE PRSNT: CPT | Performed by: INTERNAL MEDICINE

## 2023-12-06 PROCEDURE — 99213 OFFICE O/P EST LOW 20 MIN: CPT | Mod: 25 | Performed by: INTERNAL MEDICINE

## 2023-12-06 PROCEDURE — 1036F TOBACCO NON-USER: CPT | Performed by: INTERNAL MEDICINE

## 2023-12-06 PROCEDURE — 80053 COMPREHEN METABOLIC PANEL: CPT | Performed by: INTERNAL MEDICINE

## 2023-12-06 PROCEDURE — 96402 CHEMO HORMON ANTINEOPL SQ/IM: CPT

## 2023-12-06 RX ORDER — FAMOTIDINE 10 MG/ML
20 INJECTION INTRAVENOUS ONCE AS NEEDED
Status: CANCELLED | OUTPATIENT
Start: 2024-02-28

## 2023-12-06 RX ORDER — EPINEPHRINE 0.3 MG/.3ML
0.3 INJECTION SUBCUTANEOUS EVERY 5 MIN PRN
Status: CANCELLED | OUTPATIENT
Start: 2024-02-28

## 2023-12-06 RX ORDER — DIPHENHYDRAMINE HYDROCHLORIDE 50 MG/ML
50 INJECTION INTRAMUSCULAR; INTRAVENOUS AS NEEDED
Status: CANCELLED | OUTPATIENT
Start: 2024-02-28

## 2023-12-06 RX ORDER — ALBUTEROL SULFATE 0.83 MG/ML
3 SOLUTION RESPIRATORY (INHALATION) AS NEEDED
Status: CANCELLED | OUTPATIENT
Start: 2024-02-28

## 2023-12-06 RX ADMIN — LEUPROLIDE ACETATE 22.5 MG: KIT at 15:09

## 2023-12-06 ASSESSMENT — COLUMBIA-SUICIDE SEVERITY RATING SCALE - C-SSRS
2. HAVE YOU ACTUALLY HAD ANY THOUGHTS OF KILLING YOURSELF?: NO
6. HAVE YOU EVER DONE ANYTHING, STARTED TO DO ANYTHING, OR PREPARED TO DO ANYTHING TO END YOUR LIFE?: NO
1. IN THE PAST MONTH, HAVE YOU WISHED YOU WERE DEAD OR WISHED YOU COULD GO TO SLEEP AND NOT WAKE UP?: NO

## 2023-12-06 ASSESSMENT — PATIENT HEALTH QUESTIONNAIRE - PHQ9
1. LITTLE INTEREST OR PLEASURE IN DOING THINGS: NOT AT ALL
2. FEELING DOWN, DEPRESSED OR HOPELESS: NOT AT ALL
SUM OF ALL RESPONSES TO PHQ9 QUESTIONS 1 AND 2: 0

## 2023-12-06 ASSESSMENT — PAIN SCALES - GENERAL: PAINLEVEL: 0-NO PAIN

## 2023-12-06 NOTE — PATIENT INSTRUCTIONS
Continue casodex and lupron injections every 3 months.     Follow up with Dr. Yan in 3 months.     Lab appointments are no longer scheduled. Please arrive at least 30 minutes before scheduled appointment time for blood work.

## 2023-12-06 NOTE — PROGRESS NOTES
Patient her for Lupron injection tolerated well. Patient to return in 12 weeks for labs and injection. Patient denies any questions at this time. Patient discharged in stable condtion.

## 2023-12-06 NOTE — PROGRESS NOTES
Patient Visit Information:   Visit Type: Follow Up Visit      Cancer History:   Treatment Synopsis:    1.  Metastatic prostate cancer, diagnosed after right humerus bone biopsy     3/15/23 , PSA 7899     Current treatment, Lupron injection every 3 months, Casodex 50 mg p.o. daily , 3/23   5/2/23 , PSA 36.4  9/13/23 , PSA 18.67  12/6/2023 PSA 31.07 2.  Adenocarcinoma of right lung, PD-L1 70%, stage I     Right lower lobe lung mass measuring 3.9 cm with hilar and mediastinal lymphadenopathy and multiple pulmonary nodules     4/17/23 , LUNG MASS, BIOPSY:  -- ADENOCARCINOMA WITH MUCINOUS FEATURES, INVOLVING LUNG PARENCHYMA;  Status post EBUS, lymph node negative for metastatic disease      SBRT offered, patient refused     Patient is 77-year-old gentleman with history of hypertension, generalized weakness chronic lower back pain and lower extremity pain and patient was admitted in the hospital because of mechanical fall and diagnosed with right humerus fracture status post  IMN on March 1, 2020.      Biopsy of right humerus is positive for metastatic prostate cancer.      X-ray of the hip also did show multiple sclerotic bony lesion consistent with metastatic disease        March 1, 2023 CAT scan of the chest abdominal pelvis done with did show hilar lymphadenopathy, mediastinal lymphadenopathy, right lower lobe mass measuring 3.9 cm multiple pulmonary nodules and hypodense hepatic lesion measuring 5 mm and multiple sclerotic  lytic bony lesion.  MRI brain negative     Patient has diagnosis of metastatic prostate cancer on the basis of right humerus fracture and CAT scan of the chest did show right lower lobe mass measuring 3.9 cm with multiple pulmonary nodules it is quite possible we are dealing with metastatic disease  versus concurrent   lung primary also              History of Present Illness:      ID Statement:    SEVERIANO RUEDA is a 77 year old Male        Chief Complaint: Metastatic prostate cancer,  possible  metastatic lung cancer   Interval History:    Patient is 77-year-old gentleman with history of hypertension, generalized weakness chronic lower back pain and lower extremity pain and patient was admitted in the  hospital because of mechanical fall and diagnosed with right humerus fracture status post IMN on March 1, 2020.      Biopsy of right humerus is positive for metastatic prostate cancer.      X-ray of the hip also did show multiple sclerotic bony lesion consistent with metastatic disease      March 1, 2023 CAT scan of the chest abdominal pelvis done with did show hilar lymphadenopathy, mediastinal lymphadenopathy, right lower lobe mass measuring 3.9 cm multiple pulmonary nodules and hypodense hepatic lesion measuring 5 mm multiple sclerotic   lytic bony lesion.        Patient has diagnosis of metastatic prostate cancer on the basis of right humerus fracture and CAT scan of the chest did show right lower lobe mass measuring 3.9 cm with multiple pulmonary nodules it is quite possible we are dealing with metastatic disease  versus concurrent   lung primary also           Medical oncology consultation requested for metastatic disease.        Complaining of generalized weakness and fatigue, lower back pain, lower extremity pain more prominent on the right side, no headache or dizziness, no chest pain, no shortness of breath, no hemoptysis, no weight loss, no nausea vomiting, no abdominal pain.   Right humerus pain is better     12/06/23     Patient was recently diagnosed metastatic prostate cancer after right humerus biopsy.  PSA was elevated at 7899 patient was started on Casodex 50 mg p.o. daily and Lupron injection.  Patient is also evaluated for palliative radiotherapy to right humerus  area.      Current regimen Lupron injection and Casodex,  no any other complaint   no bony pain, no dysuria, no hematuria, PSA increased to 31.07, patient is asymptomatic     Feeling better, no any other new  complaint     Review of Systems:   Review of Systems:     No headache and dizziness      No fever      No chest pain or shortness of breath      Right humerus pain is better      No nausea vomiting      No abdominal      No any other bony pain      No dysuria and hematuria        Allergies and Intolerances:       Allergies:         beta blockers: Drug Category, Unknown, Active     Outpatient Medication Profile:  * Patient Currently Takes Medications as of 13-Sep-2023 12:07 documented in Structured Notes         Casodex 50 mg oral tablet : Last Dose Taken:  , 1 tab(s) orally once a day , Start Date: 06-Jul-2023         acetaminophen 325 mg oral tablet: Last Dose Taken:  , 2 tab(s) orally  every 6 hours, As Needed for pain, Start Date: 05-Mar-2023         cholecalciferol 250 mcg (10,000 intl units) oral capsule: Last Dose Taken:   , 1 cap(s) orally once a day         gabapentin 600 mg oral tablet: Last Dose Taken:  , 1 tab(s) orally 3  times a day         tafluprost 0.0015% ophthalmic solution: Last Dose Taken:  , 1 drop(s)  to each affected eye once a day (in the evening)         leuprolide-norethindrone: Last Dose Taken:  , oral and injectable every  3 months             Medical History:         Primary adenocarcinoma of lower lobe of right lung: ICD-10:  C34.31, Status: Active         Lumbar radiculopathy, chronic: ICD-10: M54.16, Status: Active         Back pain: ICD-10: M54.9, Status: Active         Mass of lower lobe of right lung: ICD-10: R91.8, Status:  Active         Malignant neoplasm of prostate metastatic to bone: ICD-10:  C61, Status: Active         Lung mass: ICD-10: R91.8, Status: Active       Surg History:         S/P eye surgery: ICD-10: Z98.890, Status: Active         S/P tonsillectomy: ICD-10: Z90.89, Status: Active         S/P appendectomy: ICD-10: Z90.49, Status: Active         S/P foot surgery: ICD-10: Z98.890, Status: Active         H/O: knee surgery: ICD-10: Z98.890, Status: Active          History of surgery on arm: ICD-10: Z98.890, Status: Active     Family History: No Family History items are recorded  in the problem list.       Social History:   Social Substance History:  ·  Smoking Status never smoker (1)   ·  Alcohol Use denies   ·  Drug Use denies            Vitals and Measurements:   Vitals: Temp: 36.9  HR: 66  RR: 16  BP: 166/84  SPO2%:   95   Measurements: HT(cm): 179.8  WT(kg): 87.8  BSA: 2.09   BMI:  27.1   Last 3 Weights & Heights: Date:                           Weight/Scale Type:                    Height:   13-Sep-2023 12:04                87.8  kg                     179.8  cm  15-Ronak-2023 13:48                86.1  kg                     179.8  cm  02-May-2023 11:07                81.4  kg                     179.8  cm      Physical Exam:      Constitutional: awake/alert/oriented x3, no distress,  complaining of back pain and lower extremity pain   Eyes: PERRL, EOMI, clear sclera   Head/Neck: Neck supple,   thyroid without mass or  tenderness, No JVD, trachea midline, no bruits   Respiratory/Thorax: Patent airways, CTAB, normal  breath sounds   Cardiovascular: Regular, rate and rhythm,  normal  S 1and S 2   Gastrointestinal: Nondistended, soft, non-tender,  no rebound tenderness or guarding, no masses palpable, no organomegaly, +BS,   Genitourinary: No CVA tenderness   Musculoskeletal: ROM intact, no joint swelling, no  thoracic spine and lumbar spine tenderness   Extremities: normal extremities, no cyanosis edema,   no clubbing   Neurological: alert and oriented x3, intact senses,  motor, response and reflexes, normal strength   Lymphatic: No significant lymphadenopathy   Psychological: Appropriate mood and behavior   Skin: Warm and dry, no lesions, no rashes         Lab Results:   0 Result Notes            Component  Ref Range & Units 13:54  (12/6/23) 2 mo ago  (9/13/23) 2 mo ago  (9/13/23) 5 mo ago  (6/16/23) 5 mo ago  (6/15/23) 5 mo ago  (6/15/23) 7 mo ago  (5/2/23)   Prostate  Specific AG  <=4.00 ng/mL 31.07 High  18.67 High  R, CM             ·  Results                Assessment and Plan:      Assessment and Plan:   Assessment:    1 metastatic prostate cancer  , Diagnosed after  right humerus bone biopsy     3/15/23 , PSA 7899  Treatment, Lupron injection every 3 months and Casodex 50 mg p.o. daily, 3/23     5/2 23 , PSA 36.4  9/13/23 , PSA 18.67     2 .  Adenocarcinoma of right lower lobe, PD-L1 71%, stage I disease     Right lower lobe lung mass measuring 3.9 cm with hilar and mediastinal lymphadenopathy and multiple pulmonary nodules  LUNG MASS, BIOPSY:  -- ADENOCARCINOMA WITH MUCINOUS FEATURES, INVOLVING LUNG PARENCHYMA;      Status post EBUS, mediastinal lymph node negative for metastatic disease      Patient was offered SBRT to right lung mass, patient refused               Patient is 77-year-old gentleman with history of hypertension, generalized weakness chronic lower back pain and lower extremity pain and patient was admitted in the hospital because of mechanical fall and diagnosed with right humerus fracture status post  IMN on March 1, 2020.      Biopsy of right humerus is positive for metastatic prostate cancer.      X-ray of the hip also did show multiple sclerotic bony lesion consistent with metastatic disease      March 1, 2023 CAT scan of the chest abdominal pelvis done with did show hilar lymphadenopathy, mediastinal lymphadenopathy, right lower lobe mass measuring 3.9 cm multiple pulmonary nodules and hypodense hepatic lesion measuring 5 mm and multiple sclerotic  lytic bony lesion. MRI brain negative     Patient has diagnosis of metastatic prostate cancer on the basis of right humerus fracture and CAT scan of the chest did show right lower lobe mass measuring 3.9 cm with multiple pulmonary nodules it is quite possible we are dealing with metastatic disease  versus concurrent   lung primary also           plan , CAT scan result and pathology result discussed with the  patient.  Pathology of right humerus did show metastatic prostate cancer and CAT scan did show right lower lung mass measuring 3.9 cm with pulmonary nodule and hilar and mediastinal lymphadenopathy.   We are ready no that we are dealing with metastatic prostate cancer, it is quite possible patient has concurrent primary lung cancer also.      Today I will check PSA, started Lupron injection and Casodex 50 mg p.o. daily, radiation oncology consultation for palliative radiotherapy to right humerus, schedule for bone scan, PET scan, check PSA.  After completion of this work I will schedule for  needle biopsy of her right lung mass  for definite diagnosis.     Time spent 1 hour     12/06/23     #1 metastatic prostate cancer, bone scan did show diffuse bony metastatic disease which is confirmed by PET scan and PET scan also did show abnormal metabolic activity in the right lower lobe.      Current treatment Lupron injection and Casodex,  monitor PSA.  Patient is also going to have palliative radiotherapy to right humerus area     Today PSA is 31.07 which is decreasing, patient is asymptomatic.    2 .  Adenocarcinoma of right lower lobe, PD-L1 71%     Right lower lobe lung mass measuring 3.9 cm with hilar and mediastinal lymphadenopathy and multiple pulmonary nodules  LUNG MASS, BIOPSY:  -- ADENOCARCINOMA WITH MUCINOUS FEATURES, INVOLVING LUNG PARENCHYMA;  Status post EBUS, linear standing lymph node negative for metastatic disease, SBRT offered,      Plan, I will continue Lupron injection Casodex and repeat PSA after 3 months, if PSA continues to increase will try Zytiga or Xtandi.  Time spent 30 minutes

## 2023-12-10 DIAGNOSIS — M79.10 MYALGIA: ICD-10-CM

## 2023-12-10 DIAGNOSIS — M54.41 CHRONIC RIGHT-SIDED LOW BACK PAIN WITH RIGHT-SIDED SCIATICA: ICD-10-CM

## 2023-12-10 DIAGNOSIS — G89.29 CHRONIC RIGHT-SIDED LOW BACK PAIN WITH RIGHT-SIDED SCIATICA: ICD-10-CM

## 2023-12-11 RX ORDER — GABAPENTIN 600 MG/1
600 TABLET ORAL 3 TIMES DAILY
Qty: 90 TABLET | Refills: 0 | Status: SHIPPED | OUTPATIENT
Start: 2023-12-11 | End: 2024-01-16

## 2023-12-11 NOTE — TELEPHONE ENCOUNTER
Looks like the mass in the right lung has gotten smaller. There is a nodule in the left lung. Looks like he saw Dr. Yan on 12/6/23. Did he review results with him?

## 2024-01-12 DIAGNOSIS — M79.10 MYALGIA: ICD-10-CM

## 2024-01-12 DIAGNOSIS — G89.29 CHRONIC RIGHT-SIDED LOW BACK PAIN WITH RIGHT-SIDED SCIATICA: ICD-10-CM

## 2024-01-12 DIAGNOSIS — M54.41 CHRONIC RIGHT-SIDED LOW BACK PAIN WITH RIGHT-SIDED SCIATICA: ICD-10-CM

## 2024-01-16 RX ORDER — GABAPENTIN 600 MG/1
600 TABLET ORAL 3 TIMES DAILY
Qty: 90 TABLET | Refills: 0 | Status: SHIPPED | OUTPATIENT
Start: 2024-01-16 | End: 2024-02-12

## 2024-02-10 DIAGNOSIS — M54.41 CHRONIC RIGHT-SIDED LOW BACK PAIN WITH RIGHT-SIDED SCIATICA: ICD-10-CM

## 2024-02-10 DIAGNOSIS — M79.10 MYALGIA: ICD-10-CM

## 2024-02-10 DIAGNOSIS — G89.29 CHRONIC RIGHT-SIDED LOW BACK PAIN WITH RIGHT-SIDED SCIATICA: ICD-10-CM

## 2024-02-12 ENCOUNTER — TELEPHONE (OUTPATIENT)
Dept: HEMATOLOGY/ONCOLOGY | Facility: CLINIC | Age: 79
End: 2024-02-12
Payer: MEDICARE

## 2024-02-12 DIAGNOSIS — C61 PROSTATE CANCER (MULTI): ICD-10-CM

## 2024-02-12 RX ORDER — GABAPENTIN 600 MG/1
600 TABLET ORAL 3 TIMES DAILY
Qty: 90 TABLET | Refills: 3 | Status: SHIPPED | OUTPATIENT
Start: 2024-02-12

## 2024-02-13 RX ORDER — BICALUTAMIDE 50 MG/1
50 TABLET, FILM COATED ORAL DAILY
Qty: 30 TABLET | Refills: 3 | Status: SHIPPED | OUTPATIENT
Start: 2024-02-13 | End: 2024-05-16

## 2024-02-23 ENCOUNTER — INFUSION (OUTPATIENT)
Dept: HEMATOLOGY/ONCOLOGY | Facility: CLINIC | Age: 79
End: 2024-02-23
Payer: MEDICARE

## 2024-02-23 VITALS
BODY MASS INDEX: 27.87 KG/M2 | OXYGEN SATURATION: 98 % | WEIGHT: 194.67 LBS | SYSTOLIC BLOOD PRESSURE: 136 MMHG | HEIGHT: 70 IN | RESPIRATION RATE: 16 BRPM | DIASTOLIC BLOOD PRESSURE: 72 MMHG | HEART RATE: 69 BPM

## 2024-02-23 DIAGNOSIS — C61 PROSTATE CANCER (MULTI): ICD-10-CM

## 2024-02-23 DIAGNOSIS — C34.91 NON-SMALL CELL CANCER OF RIGHT LUNG (MULTI): ICD-10-CM

## 2024-02-23 LAB
ALBUMIN SERPL BCP-MCNC: 4.1 G/DL (ref 3.4–5)
ALP SERPL-CCNC: 117 U/L (ref 33–136)
ALT SERPL W P-5'-P-CCNC: 12 U/L (ref 10–52)
ANION GAP SERPL CALC-SCNC: 12 MMOL/L (ref 10–20)
AST SERPL W P-5'-P-CCNC: 29 U/L (ref 9–39)
BASOPHILS # BLD AUTO: 0.02 X10*3/UL (ref 0–0.1)
BASOPHILS NFR BLD AUTO: 0.6 %
BILIRUB SERPL-MCNC: 0.4 MG/DL (ref 0–1.2)
BUN SERPL-MCNC: 18 MG/DL (ref 6–23)
CALCIUM SERPL-MCNC: 9.4 MG/DL (ref 8.6–10.3)
CHLORIDE SERPL-SCNC: 105 MMOL/L (ref 98–107)
CO2 SERPL-SCNC: 26 MMOL/L (ref 21–32)
CREAT SERPL-MCNC: 0.85 MG/DL (ref 0.5–1.3)
EGFRCR SERPLBLD CKD-EPI 2021: 88 ML/MIN/1.73M*2
EOSINOPHIL # BLD AUTO: 0.04 X10*3/UL (ref 0–0.4)
EOSINOPHIL NFR BLD AUTO: 1.1 %
ERYTHROCYTE [DISTWIDTH] IN BLOOD BY AUTOMATED COUNT: 14.6 % (ref 11.5–14.5)
GLUCOSE SERPL-MCNC: 105 MG/DL (ref 74–99)
HCT VFR BLD AUTO: 35.5 % (ref 41–52)
HGB BLD-MCNC: 11.2 G/DL (ref 13.5–17.5)
IMM GRANULOCYTES # BLD AUTO: 0.01 X10*3/UL (ref 0–0.5)
IMM GRANULOCYTES NFR BLD AUTO: 0.3 % (ref 0–0.9)
LYMPHOCYTES # BLD AUTO: 0.55 X10*3/UL (ref 0.8–3)
LYMPHOCYTES NFR BLD AUTO: 15.5 %
MCH RBC QN AUTO: 30.4 PG (ref 26–34)
MCHC RBC AUTO-ENTMCNC: 31.5 G/DL (ref 32–36)
MCV RBC AUTO: 96 FL (ref 80–100)
MONOCYTES # BLD AUTO: 0.41 X10*3/UL (ref 0.05–0.8)
MONOCYTES NFR BLD AUTO: 11.6 %
NEUTROPHILS # BLD AUTO: 2.51 X10*3/UL (ref 1.6–5.5)
NEUTROPHILS NFR BLD AUTO: 70.9 %
PLATELET # BLD AUTO: 214 X10*3/UL (ref 150–450)
POTASSIUM SERPL-SCNC: 4.4 MMOL/L (ref 3.5–5.3)
PROT SERPL-MCNC: 6.5 G/DL (ref 6.4–8.2)
PSA SERPL-MCNC: 60.99 NG/ML
RBC # BLD AUTO: 3.69 X10*6/UL (ref 4.5–5.9)
SODIUM SERPL-SCNC: 139 MMOL/L (ref 136–145)
WBC # BLD AUTO: 3.5 X10*3/UL (ref 4.4–11.3)

## 2024-02-23 PROCEDURE — 96372 THER/PROPH/DIAG INJ SC/IM: CPT | Mod: 25 | Performed by: INTERNAL MEDICINE

## 2024-02-23 PROCEDURE — 85025 COMPLETE CBC W/AUTO DIFF WBC: CPT

## 2024-02-23 PROCEDURE — 2500000004 HC RX 250 GENERAL PHARMACY W/ HCPCS (ALT 636 FOR OP/ED): Mod: JZ,JG | Performed by: INTERNAL MEDICINE

## 2024-02-23 PROCEDURE — 36415 COLL VENOUS BLD VENIPUNCTURE: CPT

## 2024-02-23 PROCEDURE — 96402 CHEMO HORMON ANTINEOPL SQ/IM: CPT

## 2024-02-23 PROCEDURE — 80053 COMPREHEN METABOLIC PANEL: CPT

## 2024-02-23 PROCEDURE — 84153 ASSAY OF PSA TOTAL: CPT

## 2024-02-23 RX ORDER — FAMOTIDINE 10 MG/ML
20 INJECTION INTRAVENOUS ONCE AS NEEDED
Status: CANCELLED | OUTPATIENT
Start: 2024-03-01

## 2024-02-23 RX ORDER — EPINEPHRINE 0.3 MG/.3ML
0.3 INJECTION SUBCUTANEOUS EVERY 5 MIN PRN
Status: DISCONTINUED | OUTPATIENT
Start: 2024-02-23 | End: 2024-02-23 | Stop reason: HOSPADM

## 2024-02-23 RX ORDER — DIPHENHYDRAMINE HYDROCHLORIDE 50 MG/ML
50 INJECTION INTRAMUSCULAR; INTRAVENOUS AS NEEDED
Status: CANCELLED | OUTPATIENT
Start: 2024-03-01

## 2024-02-23 RX ORDER — FAMOTIDINE 10 MG/ML
20 INJECTION INTRAVENOUS ONCE AS NEEDED
Status: DISCONTINUED | OUTPATIENT
Start: 2024-02-23 | End: 2024-02-23 | Stop reason: HOSPADM

## 2024-02-23 RX ORDER — ALBUTEROL SULFATE 0.83 MG/ML
3 SOLUTION RESPIRATORY (INHALATION) AS NEEDED
Status: DISCONTINUED | OUTPATIENT
Start: 2024-02-23 | End: 2024-02-23 | Stop reason: HOSPADM

## 2024-02-23 RX ORDER — ALBUTEROL SULFATE 0.83 MG/ML
3 SOLUTION RESPIRATORY (INHALATION) AS NEEDED
Status: CANCELLED | OUTPATIENT
Start: 2024-03-01

## 2024-02-23 RX ORDER — DIPHENHYDRAMINE HYDROCHLORIDE 50 MG/ML
50 INJECTION INTRAMUSCULAR; INTRAVENOUS AS NEEDED
Status: DISCONTINUED | OUTPATIENT
Start: 2024-02-23 | End: 2024-02-23 | Stop reason: HOSPADM

## 2024-02-23 RX ORDER — EPINEPHRINE 0.3 MG/.3ML
0.3 INJECTION SUBCUTANEOUS EVERY 5 MIN PRN
Status: CANCELLED | OUTPATIENT
Start: 2024-03-01

## 2024-02-23 RX ADMIN — LEUPROLIDE ACETATE 22.5 MG: KIT at 11:38

## 2024-02-23 ASSESSMENT — PAIN SCALES - GENERAL: PAINLEVEL: 0-NO PAIN

## 2024-02-23 NOTE — PROGRESS NOTES
Pt here for lupron injection and labs. Per rocio Pablo to give shot one week early. Pt tolerated injection well. He is aware of plan of care, will call with further questions or concersn. Will return in 3 months for labs, FUV and next injection.

## 2024-03-06 NOTE — CONSULTS
Service:   Service: Oncology     Consult:  Consult requested by (Attending Name): Jackson Mitchell   Reason: RLL lung mass and lytic bone lesions in humerus     History of Present Illness:   HPI:    SEVERIANO RUEDA is a 77 year old non-smoker Male with cervical and lumbar radiculopathy, glaucoma, HTN and  no previous hx of cancer presented from [facility] on 2/27 after mechanical fall followed by fracture of the mid-shaft of the R humerus. He was being seen by orthopedics at [facility] for concern  for diffuse metastatic disease to spine.  His orthopedist ordered a PET-CT for further evaluation which was not completed due to hospital admission.    He is now s/p IMN of R humerus and open biopsy 3/1.  He reports poor appetite and attributed this to his immobility because of knee/hip pain.   Wife is at bedside and states that his legs started giving out in July and since then he had multiple falls. Was referred to spinal surgeon who ordered PET scan for him  Denies cough, hemoptysis, SOB, lumps/bumps. Reported having some discomfort in his L upper chest.   States he never smoked but importantly he had a significant history of second hand smoke exposure.      During my encounter he was lying on the bed comfortably.    CT upper ext 2/27:  There is an oblique midshaft fracture of the humerus with 6 mm of  lateral and 2 mm of posterior displacement and subtle medial  angulation. Small butterfly fracture fragment is seen posteriorly.  There is heterogeneous lucency in the lateral aspect of the bone  cortex at the level of fracture which could be due to metastatic  disease or osteopenia. Diffuse osteopenia is present. There are  sclerotic bone lesions throughout the ribs, right glenoid, and  clavicle suggesting metastatic disease.  IMPRESSION:  Midshaft fracture of the right humerus, possibly pathologic.  Evidence of metastatic disease.    CT Chest / AP 3/1  right hilar lymph node measures a 2.1 x 1.9 cm.  Left hilar  lymph node measures 1.8 x 1.6 cm   right paratracheal lymph node measures 12 x9 mm      Right lower lobe lung mass measuring 3.9 x 2.3x 3.4 cm.   innumerable bilateral pulmonary parenchymal and pleural based nodules diffusely in bilateral lungs    diffuse sclerotic osseous metastasis in axial and appendicular skeleton. There is soft tissue  associated with the lesion in the left 2nd rib anteriorly.     There is a 5 mm hypodense lesion in the hepatic segment 8 adjacent to the IVC which is too small to characterize. No other  liver lesion is seen.    PMHx: as above, prediabetes  PSurgHx: BL knee surgeries, heel surgery, appendectomy, eye surgeries  Social Hx: never smoke, no etoh  FamHx: HTN, CAD, diabetes in sister; bone cancer in an aunt, unspecified cancer in an uncle          Review Family/Social History and ROS:   Social History:    Smoking Status: never smoker  (1)   Alcohol Use: denies (1)   Drug Use: denies  (1)   Drug 2 Use: denies  (1)            Allergies:  ·  beta blockers : Unknown    Objective:   Physical Exam by System:    Constitutional: awake/alert/oriented x3, no distress,  alert and cooperative   Eyes: PERRL   ENMT: no palpable LNs   Respiratory/Thorax: CTAB, normal breath sounds   Cardiovascular: Regular, rate and rhythm, no murmurs   Gastrointestinal: Nondistended, soft, non-tender  No HSM   Musculoskeletal: no edema   Extremities: Dressing in R forearm   Neurological: alert and oriented x3   Lymphatic: No significant lymphadenopathy   Skin: bruise on L forearm     Medications:    Medications:          Continuous Medications       --------------------------------  No continuous medications are active       Scheduled Medications       --------------------------------    1. Docusate:  100  mg  Oral  2 Times a Day    2. Enoxaparin SubCutaneous:  40  mg  SubCutaneous  Every 24 Hours    3. Gabapentin:  100  mg  Oral  2 Times a Day    4. Gadoterate Meglumine ([facility]-Radiology Contrast):  15.86  mL   IntraVenous Push  Once    5. Gadoterate Meglumine (Dotarem-Radiology Contrast):  15.86  mL  IntraVenous Push  Once    6. Latanoprost 0.005% Ophthalmic:  1  drop(s)  Both Eyes  At Bedtime    7. Omega-3 Acid Ethyl Esters:  1  gram(s)  Oral  Daily    8. Polyethylene Glycol:  17  gram(s)  Oral  Daily         PRN Medications       --------------------------------    1. Acetaminophen:  650  mg  Oral  Every 6 Hours    2. Melatonin:  3  mg  Oral  At Bedtime    3. Naloxone Injectable:  0.2  mg  IntraVenous Push  Once    4. oxyCODONE Immediate Release:  5  mg  Oral  Every 4 Hours    5. Sodium Chloride 0.9% Injectable Flush:  10  mL  IntraVenous Flush  Every 8 Hours and as Needed          Assessment:    SEVERIANO RUEDA is a 77 year old never smoker Male with significant history of second hand smoke exposure who presented with R humerus fx s/p IMN and open biopsy of the R humerus lesion.  Scans showed RUL mass 3.9 cmx2.3cmx3.4 cm with bilateral hilar adenopathies with multiple bilateral pleural based nodules. Also has diffuse multiple sclerotic bone lesions. Doesn't seem to have lesions in the abdomen/pelvis.    -Biopsy from bone lesion is pending. Although this is highly likely a lung cancer we need to wait for the tissue to confirm the diagnosis and stage.    -No need for additional biopsy. Please order MRI brain to complete work up.  -Dispo: Patient lives in [facility]. After tissue diagnosis patient would need an appointment close to the area. They don't want to come to [facility].     Discussed with Dr. Mason. Will continue to follow.           Consult Status:  Consult Order ID: [id number]     Attestation:   Note Completion:  I am a:  Resident/Fellow   Attending Attestation I saw and evaluated the patient.  I personally obtained the key and critical portions of the history and physical exam or was physically present for key and  critical portions performed by the resident/fellow. I reviewed the resident/fellow?s  "documentation and discussed the patient with the resident/fellow.  I agree with the resident/fellow?s medical decision making as documented in the note.     I personally evaluated the patient on 02-Mar-2023         Electronic Signatures:  HAM HERNANDEZ (Fellow))  (Signed 02-Mar-2023 14:13)   Authored: Service, History of Present Illness, Review  Family/Social History and ROS, Allergies, Objective, Assessment/Recommendations  Clarisa Mason)  (Signed 03-Mar-2023 17:32)   Authored: Note Completion   Co-Signer: Service, History of Present Illness, Review Family/Social History and ROS, Allergies, Objective, Assessment/Recommendations      Last Updated: 03-Mar-2023 17:32 by Clarisa Mason)    References:  1.  Data Referenced From \"Consult-Orthopaedics\" 28-Feb-2023 18:54   "

## 2024-03-06 NOTE — CONSULTS
Service:   Service: Orthopaedics     Consult:  Consult requested by (Attending Name): Jackson Mitchell   Reason: midshaft R humerus fracture, possibly pathologic     History of Present Illness:   HPI:    Injury: R midshaft humerus fx  HPI: 77M (cervical and lumbar radiculopathy, glaucoma, HTN) s/p fall into wall w above. Transferred from [facility]. Currently undergoing workup at [facility] for concern for diffuse metastatic disease to spine. Also reports 25 lb unintentional weight  loss since 11/2022.       Location: Painful at R arm  Duration: since fall  Worsened by movement/Palpation, improved with rest  Closed, NVI  Associated symptoms:  no associated numbness/tingling/weakness    Other Injuries: denies      Past medical history: per HPI  Past surgical history: per HPI, rest reviewed in EMR  Allergies: per EMR  Medications:  per EMR  - Blood thinners: denies  Social History:   - EtOH: denies  - Tobacco: denies  - Other: denies  Family History:  Non-contributory to this patient's acute surgical issue.    Review of Systems:  Negative except per HPI         Review Family/Social History and ROS:   Social History:    Smoking Status: never smoker  (1)   Alcohol Use: denies (1)   Drug Use: denies  (1)   Drug 2 Use: denies  (1)            Allergies:  ·  beta blockers : Unknown    Objective:     Objective Information:        T   P  R  BP   MAP  SpO2   Value  37  78  18  160/89   113  92%  Date/Time 2/28 13:54 2/28 13:54 2/28 13:54 2/28 13:54  2/28 13:54 2/28 13:54  Range  (36.8C - 37C )  (78 - 85 )  (15 - 18 )  (160 - 174 )/ (89 - 92 )  (113 - 119 )  (92% - 94% )  Highest temp of 37 C was recorded at 2/28 13:54    Physical Exam Narrative:  ·  Physical Exam:    Constitutional: NAD  HEENT: hearing and vision grossly intact, MMM  Resp: breathing comfortably   CV: extremities warm, well perfused  GI: abd soft  Neuro: AAOx3, sensory and motor grossly intact  Psych: Appropriate mood and affect    RUE:     - Skin  intact  -Tender at site of injury with painful ROM.  -Motor intact in axillary/AIN/PIN/ulnar distributions  -SILT axillary/radial/median/ulnar distributions  -Hand warm, well perfused  -Palpable  radial pulse, cap refill brisk  -Compartments soft and compressible      Radiology Results:    Results:        Impression:    Limited study. No fracture seen within the limitations of projection.  Soft tissue swelling about the elbow.      Xray Elbow Complete Min 3 View [Feb 28 2023  6:27PM]      Impression:  Xray Humerus 2 View [Feb 28 2023  5:25PM]        Assessment:    Injury: R midshaft humerus fx  HPI: 77M (cervical and lumbar radiculopathy, glaucoma, HTN) s/p fall into wall w above. Transferred from [facility]. Currently undergoing workup at [facility] for concern for diffuse metastatic disease to spine. Also reports 25 lb unintentional weight  loss since 11/2022. On exam, closed, NVI. XR w short oblique fx of R midshaft humerus. CT w subtle cortical lucency at fracture site. Placed in a coaptation splint. Post splint XR w improved alignment.       Plan:   - Recommend workup for mets of unknown origin including CT chest/ abdomen/pelvis, SPEP, UPEP, MRI R humerus  - NPO at midnight for upcoming surgery with orthopedics.  - Admit to medicine, clearance pending for OR tomorrow; Appreciate documentation of clearance by primary team  - Please obtain pre-operative labs/studies: T&S, PT/INR, CBC, BMP, COVID, CXR, EKG  - Consented and posted to OR schedule for ORIF R humerus, open biopsy w/ orthopedic surgery on 3/1  - Strict Bedrest, NWB R upper extremity in coaptation splint.   - Pre-operative ABx: None indicated   - No indication for transfusion pre-operatively  - DVT PPx: SCDs, okay for chemoppx per primary    This consult was staffed with attending physician, Dr. Will.    Coleen Fenton, PGY-2  Orthopaedic Surgery  On Call Resident  Pager: [phone]  Available via Doc Halo    Please contact below team for remainder of  "care during daytime hours (7am - 6 pm):    The patient will be followed by the Orthopedic Trauma service. Please page the corresponding resident (below) with questions or concerns:  Ortho Trauma Service: (Tiki Raphael)  1st call: Tiffany Wu MD, PGY1 ([phone])  2nd call: Jesús Sheppard MD, PGY3 ([id number])        Consult Status:  Consult Order ID: [id number]     Attestation:   Note Completion:  I am a:  Resident/Fellow   Attending Attestation I saw and evaluated the patient.  I personally obtained the key and critical portions of the history and physical exam or was physically present for key and  critical portions performed by the resident/fellow. I reviewed the resident/fellow?s documentation and discussed the patient with the resident/fellow.  I agree with the resident/fellow?s medical decision making as documented in the note.     I personally evaluated the patient on 28-Feb-2023         Electronic Signatures:  Coleen Fenton (Resident))  (Signed 28-Feb-2023 19:08)   Authored: Service, History of Present Illness, Review  Family/Social History and ROS, Allergies, Objective, Assessment/Recommendations, Note Completion  Adithya Will)  (Signed 01-Mar-2023 11:08)   Authored: Note Completion   Co-Signer: Service, History of Present Illness, Review Family/Social History and ROS, Allergies, Objective, Assessment/Recommendations,  Note Completion      Last Updated: 01-Mar-2023 11:08 by Adithya Will)    References:  1.  Data Referenced From \"Patient Profile - Adult v2\" 28-Feb-2023 13:32   "

## 2024-03-18 ENCOUNTER — OFFICE VISIT (OUTPATIENT)
Dept: ORTHOPEDIC SURGERY | Facility: CLINIC | Age: 79
End: 2024-03-18
Payer: MEDICARE

## 2024-03-18 ENCOUNTER — APPOINTMENT (OUTPATIENT)
Dept: HEMATOLOGY/ONCOLOGY | Facility: CLINIC | Age: 79
End: 2024-03-18
Payer: MEDICARE

## 2024-03-18 ENCOUNTER — HOSPITAL ENCOUNTER (OUTPATIENT)
Dept: RADIOLOGY | Facility: CLINIC | Age: 79
Discharge: HOME | End: 2024-03-18
Payer: MEDICARE

## 2024-03-18 VITALS — WEIGHT: 194 LBS | HEIGHT: 70 IN | BODY MASS INDEX: 27.77 KG/M2

## 2024-03-18 DIAGNOSIS — S42.391D OTHER CLOSED FRACTURE OF SHAFT OF RIGHT HUMERUS WITH ROUTINE HEALING, SUBSEQUENT ENCOUNTER: ICD-10-CM

## 2024-03-18 DIAGNOSIS — M84.50XA PATHOLOGICAL FRACTURE DUE TO METASTATIC BONE DISEASE: Primary | ICD-10-CM

## 2024-03-18 PROCEDURE — 1036F TOBACCO NON-USER: CPT | Performed by: ORTHOPAEDIC SURGERY

## 2024-03-18 PROCEDURE — 73060 X-RAY EXAM OF HUMERUS: CPT | Mod: RIGHT SIDE | Performed by: RADIOLOGY

## 2024-03-18 PROCEDURE — 99214 OFFICE O/P EST MOD 30 MIN: CPT | Performed by: ORTHOPAEDIC SURGERY

## 2024-03-18 PROCEDURE — 73060 X-RAY EXAM OF HUMERUS: CPT | Mod: RT

## 2024-03-18 PROCEDURE — 1159F MED LIST DOCD IN RCRD: CPT | Performed by: ORTHOPAEDIC SURGERY

## 2024-03-18 NOTE — PROGRESS NOTES
Subjective    Patient ID: Shalom Horton is a 78 y.o. male.    Chief Complaint: Follow-up (FUV; Humerus fx )     Last Surgery: Right humerus intramedullary fixation with open biopsy of pathologic fracture on March 1, 2023    HPI  Patient has history of lung malignancy with metastatic disease and pathologic fracture of the right humerus.  He underwent intramedullary fixation by me.  Patient has done well.  He has no pain in the arm.  He has no pain in the hips and extremities.  He reported that his cancer is stable.  Overall he is stable with the progress is made with his arm.  Objective   Ortho Exam  The patient is well-nourished and well-developed and in no acute distress. The patient displayed normal mood and affect. The patient's pupils were equal, round sclerae are white. Patient's respirations appear to be regular and unlabored.    Evaluation of the arm reveals well-healed incision.  Patient has excellent range of motion of the shoulder.  No numbness or tingling.  No tenderness to palpation.  Image Results:  I personally reviewed right humerus radiograph reveals healed fracture.  Stable intramedullary nail fixation.  There is sclerosis throughout the humeral which is stable.    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  He has union of his fracture.  He is bone is stable without any lucencies or fractures.  Patient has no other pain noted in the extremity.  I recommend use of the arm without any restrictions at this point.  I discussed with him potential for metastasis to all the bone and concern for impending fractures.  Patient will come back and see us if he has any pain with function.  Otherwise he will follow-up as needed and also with his oncology team.  Orders Placed This Encounter    XR humerus right     No follow-ups on file.

## 2024-05-15 DIAGNOSIS — C61 PROSTATE CANCER (MULTI): Primary | ICD-10-CM

## 2024-05-16 ENCOUNTER — INFUSION (OUTPATIENT)
Dept: HEMATOLOGY/ONCOLOGY | Facility: CLINIC | Age: 79
End: 2024-05-16
Payer: MEDICARE

## 2024-05-16 ENCOUNTER — APPOINTMENT (OUTPATIENT)
Dept: HEMATOLOGY/ONCOLOGY | Facility: CLINIC | Age: 79
End: 2024-05-16
Payer: MEDICARE

## 2024-05-16 ENCOUNTER — OFFICE VISIT (OUTPATIENT)
Dept: HEMATOLOGY/ONCOLOGY | Facility: CLINIC | Age: 79
End: 2024-05-16
Payer: MEDICARE

## 2024-05-16 ENCOUNTER — ONCOLOGY MEDICATION OUTREACH (OUTPATIENT)
Dept: HEMATOLOGY/ONCOLOGY | Facility: CLINIC | Age: 79
End: 2024-05-16

## 2024-05-16 VITALS
OXYGEN SATURATION: 100 % | HEART RATE: 75 BPM | SYSTOLIC BLOOD PRESSURE: 140 MMHG | TEMPERATURE: 97.9 F | WEIGHT: 178.46 LBS | BODY MASS INDEX: 25.55 KG/M2 | HEIGHT: 70 IN | DIASTOLIC BLOOD PRESSURE: 77 MMHG | RESPIRATION RATE: 16 BRPM

## 2024-05-16 DIAGNOSIS — C61 PROSTATE CANCER (MULTI): ICD-10-CM

## 2024-05-16 DIAGNOSIS — C34.91 NON-SMALL CELL CANCER OF RIGHT LUNG (MULTI): ICD-10-CM

## 2024-05-16 LAB
ALBUMIN SERPL BCP-MCNC: 3.7 G/DL (ref 3.4–5)
ALP SERPL-CCNC: 132 U/L (ref 33–136)
ALT SERPL W P-5'-P-CCNC: 11 U/L (ref 10–52)
ANION GAP SERPL CALC-SCNC: 12 MMOL/L (ref 10–20)
AST SERPL W P-5'-P-CCNC: 24 U/L (ref 9–39)
BASOPHILS # BLD AUTO: 0.02 X10*3/UL (ref 0–0.1)
BASOPHILS NFR BLD AUTO: 0.5 %
BILIRUB SERPL-MCNC: 0.4 MG/DL (ref 0–1.2)
BUN SERPL-MCNC: 13 MG/DL (ref 6–23)
CALCIUM SERPL-MCNC: 9 MG/DL (ref 8.6–10.3)
CHLORIDE SERPL-SCNC: 102 MMOL/L (ref 98–107)
CO2 SERPL-SCNC: 28 MMOL/L (ref 21–32)
CREAT SERPL-MCNC: 0.68 MG/DL (ref 0.5–1.3)
EGFRCR SERPLBLD CKD-EPI 2021: >90 ML/MIN/1.73M*2
EOSINOPHIL # BLD AUTO: 0.05 X10*3/UL (ref 0–0.4)
EOSINOPHIL NFR BLD AUTO: 1.3 %
ERYTHROCYTE [DISTWIDTH] IN BLOOD BY AUTOMATED COUNT: 15.1 % (ref 11.5–14.5)
GLUCOSE SERPL-MCNC: 147 MG/DL (ref 74–99)
HCT VFR BLD AUTO: 33 % (ref 41–52)
HGB BLD-MCNC: 10.1 G/DL (ref 13.5–17.5)
IMM GRANULOCYTES # BLD AUTO: 0.01 X10*3/UL (ref 0–0.5)
IMM GRANULOCYTES NFR BLD AUTO: 0.3 % (ref 0–0.9)
LYMPHOCYTES # BLD AUTO: 0.69 X10*3/UL (ref 0.8–3)
LYMPHOCYTES NFR BLD AUTO: 17.6 %
MCH RBC QN AUTO: 29.5 PG (ref 26–34)
MCHC RBC AUTO-ENTMCNC: 30.6 G/DL (ref 32–36)
MCV RBC AUTO: 97 FL (ref 80–100)
MONOCYTES # BLD AUTO: 0.44 X10*3/UL (ref 0.05–0.8)
MONOCYTES NFR BLD AUTO: 11.2 %
NEUTROPHILS # BLD AUTO: 2.71 X10*3/UL (ref 1.6–5.5)
NEUTROPHILS NFR BLD AUTO: 69.1 %
PLATELET # BLD AUTO: 289 X10*3/UL (ref 150–450)
POTASSIUM SERPL-SCNC: 3.9 MMOL/L (ref 3.5–5.3)
PROT SERPL-MCNC: 6.4 G/DL (ref 6.4–8.2)
PSA SERPL-MCNC: 247 NG/ML
RBC # BLD AUTO: 3.42 X10*6/UL (ref 4.5–5.9)
SODIUM SERPL-SCNC: 138 MMOL/L (ref 136–145)
WBC # BLD AUTO: 3.9 X10*3/UL (ref 4.4–11.3)

## 2024-05-16 PROCEDURE — 1159F MED LIST DOCD IN RCRD: CPT | Performed by: INTERNAL MEDICINE

## 2024-05-16 PROCEDURE — 96402 CHEMO HORMON ANTINEOPL SQ/IM: CPT

## 2024-05-16 PROCEDURE — 84153 ASSAY OF PSA TOTAL: CPT | Performed by: INTERNAL MEDICINE

## 2024-05-16 PROCEDURE — 85025 COMPLETE CBC W/AUTO DIFF WBC: CPT | Performed by: INTERNAL MEDICINE

## 2024-05-16 PROCEDURE — 99214 OFFICE O/P EST MOD 30 MIN: CPT | Performed by: INTERNAL MEDICINE

## 2024-05-16 PROCEDURE — 36415 COLL VENOUS BLD VENIPUNCTURE: CPT | Performed by: INTERNAL MEDICINE

## 2024-05-16 PROCEDURE — 1160F RVW MEDS BY RX/DR IN RCRD: CPT | Performed by: INTERNAL MEDICINE

## 2024-05-16 PROCEDURE — 2500000004 HC RX 250 GENERAL PHARMACY W/ HCPCS (ALT 636 FOR OP/ED): Mod: JZ,JG | Performed by: INTERNAL MEDICINE

## 2024-05-16 PROCEDURE — 84075 ASSAY ALKALINE PHOSPHATASE: CPT | Performed by: INTERNAL MEDICINE

## 2024-05-16 PROCEDURE — 1126F AMNT PAIN NOTED NONE PRSNT: CPT | Performed by: INTERNAL MEDICINE

## 2024-05-16 RX ORDER — FAMOTIDINE 10 MG/ML
20 INJECTION INTRAVENOUS ONCE AS NEEDED
OUTPATIENT
Start: 2024-08-08

## 2024-05-16 RX ORDER — EPINEPHRINE 0.3 MG/.3ML
0.3 INJECTION SUBCUTANEOUS EVERY 5 MIN PRN
OUTPATIENT
Start: 2024-08-08

## 2024-05-16 RX ORDER — ABIRATERONE 500 MG/1
1000 TABLET ORAL DAILY
Qty: 60 TABLET | Refills: 3 | Status: SHIPPED | OUTPATIENT
Start: 2024-05-16

## 2024-05-16 RX ORDER — DIPHENHYDRAMINE HYDROCHLORIDE 50 MG/ML
50 INJECTION INTRAMUSCULAR; INTRAVENOUS AS NEEDED
OUTPATIENT
Start: 2024-08-08

## 2024-05-16 RX ORDER — PREDNISONE 5 MG/1
5 TABLET ORAL DAILY
Qty: 30 TABLET | Refills: 3 | Status: SHIPPED | OUTPATIENT
Start: 2024-05-16

## 2024-05-16 RX ORDER — ALBUTEROL SULFATE 0.83 MG/ML
3 SOLUTION RESPIRATORY (INHALATION) AS NEEDED
OUTPATIENT
Start: 2024-08-08

## 2024-05-16 RX ADMIN — LEUPROLIDE ACETATE 22.5 MG: KIT at 15:01

## 2024-05-16 ASSESSMENT — NCCN CANCER DISTRESS MANAGEMENT
NCCN PHYSICAL CONCERNS: 6
NCCN PHYSICAL CONCERNS: 2

## 2024-05-16 ASSESSMENT — PAIN SCALES - GENERAL: PAINLEVEL: 0-NO PAIN

## 2024-05-16 NOTE — PROGRESS NOTES
Patient Visit Information:   Visit Type: Follow Up Visit      Cancer History:   Treatment Synopsis:    1.  Metastatic prostate cancer, diagnosed after right humerus bone biopsy     3/15/23 , PSA 7899     Current treatment, Lupron injection every 3 months, Casodex 50 mg p.o. daily , 3/23   5/2/23 , PSA 36.4  9/13/23 , PSA 18.67  12/6/2023 PSA 31.07 2.    2/23/24 , PSA 60.99      2.Adenocarcinoma of right lung, PD-L1 70%, stage I     Right lower lobe lung mass measuring 3.9 cm with hilar and mediastinal lymphadenopathy and multiple pulmonary nodules     4/17/23 , LUNG MASS, BIOPSY:  -- ADENOCARCINOMA WITH MUCINOUS FEATURES, INVOLVING LUNG PARENCHYMA;  Status post EBUS, lymph node negative for metastatic disease    SBRT offered, patient refused    [place]   Patient is 77-year-old gentleman with history of hypertension, generalized weakness chronic lower back pain and lower extremity pain and patient was admitted in the hospital because of mechanical fall and diagnosed with right humerus fracture status post  IMN on March 1, 2020.      Biopsy of right humerus is positive for metastatic prostate cancer.      X-ray of the hip also did show multiple sclerotic bony lesion consistent with metastatic disease      March 1, 2023 CAT scan of the chest abdominal pelvis done with did show hilar lymphadenopathy, mediastinal lymphadenopathy, right lower lobe mass measuring 3.9 cm multiple pulmonary nodules and hypodense hepatic lesion measuring 5 mm and multiple sclerotic  lytic bony lesion.  MRI brain negative   Patient has diagnosis of metastatic prostate cancer on the basis of right humerus fracture and CAT scan of the chest did show right lower lobe mass measuring 3.9 cm with multiple pulmonary nodules it is quite possible we are dealing with metastatic disease  versus concurrent   lung primary also              History of Present Illness:      ID Statement:    SEVERIANO RUEDA is a 77 year old Male        Chief Complaint:  Metastatic prostate cancer, possible  metastatic lung cancer   Interval History:    5/16/24    Patient has a history of metastatic prostate cancer receiving Lupron and Casodex.  PSA started to increase in February 2024 PSA was 60.0 today PSA is pending.  Patient complaining of weakness fatigue, constipation, no back pain no dysuria and hematuria.  Lab result discussed with the patient.                Patient is 77-year-old gentleman with history of hypertension, generalized weakness chronic lower back pain and lower extremity pain and patient was admitted in the  hospital because of mechanical fall and diagnosed with right humerus fracture status post IMN on March 1, 2020.      Biopsy of right humerus is positive for metastatic prostate cancer.      X-ray of the hip also did show multiple sclerotic bony lesion consistent with metastatic disease      March 1, 2023 CAT scan of the chest abdominal pelvis done with did show hilar lymphadenopathy, mediastinal lymphadenopathy, right lower lobe mass measuring 3.9 cm multiple pulmonary nodules and hypodense hepatic lesion measuring 5 mm multiple sclerotic   lytic bony lesion.        Patient has diagnosis of metastatic prostate cancer on the basis of right humerus fracture and CAT scan of the chest did show right lower lobe mass measuring 3.9 cm with multiple pulmonary nodules it is quite possible we are dealing with metastatic disease  versus concurrent   lung primary also           Medical oncology consultation requested for metastatic disease.        Complaining of generalized weakness and fatigue, lower back pain, lower extremity pain more prominent on the right side, no headache or dizziness, no chest pain, no shortness of breath, no hemoptysis, no weight loss, no nausea vomiting, no abdominal pain.   Right humerus pain is better     12/06/23     Patient was recently diagnosed metastatic prostate cancer after right humerus biopsy.  PSA was elevated at 7899 patient was  started on Casodex 50 mg p.o. daily and Lupron injection.  Patient is also evaluated for palliative radiotherapy to right humerus  area.      Current regimen Lupron injection and Casodex,  no any other complaint   no bony pain, no dysuria, no hematuria, PSA increased to 31.07, patient is asymptomatic     Feeling better, no any other new complaint     Review of Systems:   Review of Systems:     No headache and dizziness      No fever      No chest pain or shortness of breath      Right humerus pain is better      No nausea vomiting      No abdominal      No any other bony pain      No dysuria and hematuria        Allergies and Intolerances:       Allergies:         beta blockers: Drug Category, Unknown, Active     Outpatient Medication Profile:  * Patient Currently Takes Medications as of 13-Sep-2023 12:07 documented in Structured Notes         Casodex 50 mg oral tablet : Last Dose Taken:  , 1 tab(s) orally once a day , Start Date: 06-Jul-2023         acetaminophen 325 mg oral tablet: Last Dose Taken:  , 2 tab(s) orally  every 6 hours, As Needed for pain, Start Date: 05-Mar-2023         cholecalciferol 250 mcg (10,000 intl units) oral capsule: Last Dose Taken:   , 1 cap(s) orally once a day         gabapentin 600 mg oral tablet: Last Dose Taken:  , 1 tab(s) orally 3  times a day         tafluprost 0.0015% ophthalmic solution: Last Dose Taken:  , 1 drop(s)  to each affected eye once a day (in the evening)         leuprolide-norethindrone: Last Dose Taken:  , oral and injectable every  3 months             Medical History:         Primary adenocarcinoma of lower lobe of right lung: ICD-10:  C34.31, Status: Active         Lumbar radiculopathy, chronic: ICD-10: M54.16, Status: Active         Back pain: ICD-10: M54.9, Status: Active         Mass of lower lobe of right lung: ICD-10: R91.8, Status:  Active         Malignant neoplasm of prostate metastatic to bone: ICD-10:  C61, Status: Active         Lung mass: ICD-10:  R91.8, Status: Active       Surg History:         S/P eye surgery: ICD-10: Z98.890, Status: Active         S/P tonsillectomy: ICD-10: Z90.89, Status: Active         S/P appendectomy: ICD-10: Z90.49, Status: Active         S/P foot surgery: ICD-10: Z98.890, Status: Active         H/O: knee surgery: ICD-10: Z98.890, Status: Active         History of surgery on arm: ICD-10: Z98.890, Status: Active     Family History: No Family History items are recorded  in the problem list.       Social History:   Social Substance History:  ·  Smoking Status never smoker (1)   ·  Alcohol Use denies   ·  Drug Use denies            Vitals and Measurements:   Vitals: Temp: 36.9  HR: 66  RR: 16  BP: 166/84  SPO2%:   95   Measurements: HT(cm): 179.8  WT(kg): 87.8  BSA: 2.09   BMI:  27.1   Last 3 Weights & Heights: Date:                           Weight/Scale Type:                    Height:   13-Sep-2023 12:04                87.8  kg                     179.8  cm  15-Ronak-2023 13:48                86.1  kg                     179.8  cm  02-May-2023 11:07                81.4  kg                     179.8  cm      Physical Exam:      Constitutional: awake/alert/oriented x3, no distress,  complaining of back pain and lower extremity pain   Eyes: PERRL, EOMI, clear sclera   Head/Neck: Neck supple,   thyroid without mass or  tenderness, No JVD, trachea midline, no bruits   Respiratory/Thorax: Patent airways, CTAB, normal  breath sounds   Cardiovascular: Regular, rate and rhythm,  normal  S 1and S 2   Gastrointestinal: Nondistended, soft, non-tender,  no rebound tenderness or guarding, no masses palpable, no organomegaly, +BS,   Genitourinary: No CVA tenderness   Musculoskeletal: ROM intact, no joint swelling, no  thoracic spine and lumbar spine tenderness   Extremities: normal extremities, no cyanosis edema,   no clubbing   Neurological: alert and oriented x3, intact senses,  motor, response and reflexes, normal strength   Lymphatic: No  significant lymphadenopathy   Psychological: Appropriate mood and behavior   Skin: Warm and dry, no lesions, no rashes         Lab Results:   0 Result Notes            Component  Ref Range & Units 13:54  (12/6/23) 2 mo ago  (9/13/23) 2 mo ago  (9/13/23) 5 mo ago  (6/16/23) 5 mo ago  (6/15/23) 5 mo ago  (6/15/23) 7 mo ago  (5/2/23)   Prostate Specific AG  <=4.00 ng/mL 31.07 High  18.67 High  R, CM             ·  Results          Component  Ref Range & Units 2 mo ago  (2/23/24) 5 mo ago  (12/6/23) 8 mo ago  (9/13/23) 8 mo ago  (9/13/23) 11 mo ago  (6/16/23) 11 mo ago  (6/15/23) 11 mo ago  (6/15/23)   Prostate Specific AG  <=4.00 ng/mL 60.99 High  31.07 High  18.67 High  R, CM CANCELED R, CM CANCELED R, CM 23.44 High  R, CM CA           Assessment and Plan:      Assessment and Plan:   Assessment:    1 metastatic prostate cancer  , Diagnosed after  right humerus bone biopsy     3/15/23 , PSA 7899  Treatment, Lupron injection every 3 months and Casodex 50 mg p.o. daily, 3/23     5/2 23 , PSA 36.4  9/13/23 , PSA 18.67   /23/24 , PSA 16.9  2 .  Adenocarcinoma of right lower lobe, PD-L1 71%, stage I disease     Right lower lobe lung mass measuring 3.9 cm with hilar and mediastinal lymphadenopathy and multiple pulmonary nodules  LUNG MASS, BIOPSY:  -- ADENOCARCINOMA WITH MUCINOUS FEATURES, INVOLVING LUNG PARENCHYMA;    Status post EBUS, mediastinal lymph node negative for metastatic disease    Patient was offered SBRT to right lung mass, patient refused                      plan ,    5/16/24     #1 metastatic prostate cancer, bone scan did show diffuse bony metastatic disease which is confirmed by PET scan and PET scan also did show abnormal metabolic activity in the right lower lobe.      Current treatment Lupron injection and Casodex,  monitor PSA.  Patient is also going to have palliative radiotherapy to right humerus area  In February 2024 PSA increased to 16.99, discussed with patient       2 .  Adenocarcinoma of right  lower lobe, PD-L1 71%     Right lower lobe lung mass measuring 3.9 cm with hilar and mediastinal lymphadenopathy and multiple pulmonary nodules  LUNG MASS, BIOPSY:  -- ADENOCARCINOMA WITH MUCINOUS FEATURES, INVOLVING LUNG PARENCHYMA;  Status post EBUS, linear standing lymph node negative for metastatic disease, SBRT offered,      Plan, patient has progressive metastatic disease, I will continue Lupron injection I will start Zytiga.  Possible side effect of Zytiga discussed with patient, basic information provided.  Repeat PSA after 3 months and follow-up after 3 months.

## 2024-05-16 NOTE — PROGRESS NOTES
Reviewed Zytiga, dose, frequency, administration, treatment cycle, duration of therapy, and missed doses. Counseled on potential side effects including but not limited to flushing, muscle/joint pain.  Provided medication/regimen handout. All questions answered and contact information was given to patient.

## 2024-05-16 NOTE — PROGRESS NOTES
Patient here for Lupron injectio tolerated well. Patient to return 08/09 for labs and treatment. Patient denies any questions at this time. Patient discharged in stable condition.

## 2024-05-16 NOTE — PATIENT INSTRUCTIONS
Stop casodex.     Continue lupron every 3 months.     Prescription for zytiga will be sent to  specialty pharmacy who will obtain insurance approval and call you to schedule delivery of medication once approval is received. Call this office at [phone] once medication is received.     You will also receive a prescription for prednisone to be taken daily. It is best taken in the morning with food. This will be sent to your local pharmacy.     Follow up with Dr. Yan in 3 months.

## 2024-05-17 ENCOUNTER — APPOINTMENT (OUTPATIENT)
Dept: HEMATOLOGY/ONCOLOGY | Facility: CLINIC | Age: 79
End: 2024-05-17
Payer: MEDICARE

## 2024-05-17 ENCOUNTER — SPECIALTY PHARMACY (OUTPATIENT)
Dept: PHARMACY | Facility: CLINIC | Age: 79
End: 2024-05-17

## 2024-05-20 PROCEDURE — RXMED WILLOW AMBULATORY MEDICATION CHARGE

## 2024-05-22 ENCOUNTER — SPECIALTY PHARMACY (OUTPATIENT)
Dept: HEMATOLOGY/ONCOLOGY | Facility: CLINIC | Age: 79
End: 2024-05-22
Payer: MEDICARE

## 2024-05-23 ENCOUNTER — OFFICE VISIT (OUTPATIENT)
Dept: PRIMARY CARE | Facility: CLINIC | Age: 79
End: 2024-05-23
Payer: MEDICARE

## 2024-05-23 VITALS
DIASTOLIC BLOOD PRESSURE: 58 MMHG | HEIGHT: 70 IN | BODY MASS INDEX: 25.48 KG/M2 | SYSTOLIC BLOOD PRESSURE: 130 MMHG | OXYGEN SATURATION: 97 % | HEART RATE: 67 BPM | WEIGHT: 178 LBS | TEMPERATURE: 97.8 F

## 2024-05-23 DIAGNOSIS — G25.0 BENIGN ESSENTIAL TREMOR: ICD-10-CM

## 2024-05-23 DIAGNOSIS — C34.91 ADENOCARCINOMA, LUNG, RIGHT (MULTI): ICD-10-CM

## 2024-05-23 DIAGNOSIS — C61 PROSTATE CANCER (MULTI): ICD-10-CM

## 2024-05-23 DIAGNOSIS — M79.10 MYALGIA: ICD-10-CM

## 2024-05-23 DIAGNOSIS — C34.91 NON-SMALL CELL CANCER OF RIGHT LUNG (MULTI): ICD-10-CM

## 2024-05-23 DIAGNOSIS — M35.3 POLYMYALGIA RHEUMATICA (MULTI): ICD-10-CM

## 2024-05-23 DIAGNOSIS — G89.29 CHRONIC RIGHT-SIDED LOW BACK PAIN WITH RIGHT-SIDED SCIATICA: ICD-10-CM

## 2024-05-23 DIAGNOSIS — Z00.00 MEDICARE ANNUAL WELLNESS VISIT, SUBSEQUENT: Primary | ICD-10-CM

## 2024-05-23 DIAGNOSIS — M54.41 CHRONIC RIGHT-SIDED LOW BACK PAIN WITH RIGHT-SIDED SCIATICA: ICD-10-CM

## 2024-05-23 DIAGNOSIS — H47.20 LEFT OPTIC NERVE ATROPHY: ICD-10-CM

## 2024-05-23 PROCEDURE — 1123F ACP DISCUSS/DSCN MKR DOCD: CPT | Performed by: FAMILY MEDICINE

## 2024-05-23 PROCEDURE — 1158F ADVNC CARE PLAN TLK DOCD: CPT | Performed by: FAMILY MEDICINE

## 2024-05-23 PROCEDURE — G0439 PPPS, SUBSEQ VISIT: HCPCS | Performed by: FAMILY MEDICINE

## 2024-05-23 PROCEDURE — 1170F FXNL STATUS ASSESSED: CPT | Performed by: FAMILY MEDICINE

## 2024-05-23 PROCEDURE — 99214 OFFICE O/P EST MOD 30 MIN: CPT | Performed by: FAMILY MEDICINE

## 2024-05-23 PROCEDURE — 1160F RVW MEDS BY RX/DR IN RCRD: CPT | Performed by: FAMILY MEDICINE

## 2024-05-23 PROCEDURE — 1159F MED LIST DOCD IN RCRD: CPT | Performed by: FAMILY MEDICINE

## 2024-05-23 RX ORDER — GABAPENTIN 600 MG/1
600 TABLET ORAL 3 TIMES DAILY
Qty: 90 TABLET | Refills: 3 | Status: CANCELLED | OUTPATIENT
Start: 2024-05-23

## 2024-05-23 ASSESSMENT — ACTIVITIES OF DAILY LIVING (ADL)
DRESSING: INDEPENDENT
TAKING_MEDICATION: INDEPENDENT
BATHING: INDEPENDENT
GROCERY_SHOPPING: INDEPENDENT
DOING_HOUSEWORK: NEEDS ASSISTANCE
MANAGING_FINANCES: INDEPENDENT

## 2024-05-23 ASSESSMENT — PATIENT HEALTH QUESTIONNAIRE - PHQ9
2. FEELING DOWN, DEPRESSED OR HOPELESS: NOT AT ALL
1. LITTLE INTEREST OR PLEASURE IN DOING THINGS: NOT AT ALL
SUM OF ALL RESPONSES TO PHQ9 QUESTIONS 1 AND 2: 0

## 2024-05-23 NOTE — PROGRESS NOTES
"Subjective   Reason for Visit: Shalom Horton is an 78 y.o. male here for a Medicare Wellness visit.     Past Medical, Surgical, and Family History reviewed and updated in chart.         HPI  Reveiwed Chronic medical issues    Swelling in the right leg.  If wears stockings the swelling is good.  Has been having this for about 6 months.      When the sciatica flares he takes advil.  Gabapentin does not seem to help the sciatica.  Will be month or 2 and then it will come and will last 4-14 days.  Taking OTC advil when needed and takes 2 advil Pms at bedtime.  Going to the gym and gets a flare     Taking off of Casodex because numbers going up.  Going onto Zytiga.      Seeing neurologist for the sciatica.  Dr Pratt  Patient Care Team:  Gian Waller DO as PCP - General  Gian Waller DO as PCP - [employer] PCP  Gracy Yan MD as Consulting Physician (Hematology and Oncology)     Review of Systems    Objective   Vitals:  /58 (BP Location: Right arm, Patient Position: Sitting, BP Cuff Size: Adult)   Pulse 67   Temp 36.6 °C (97.8 °F)   Ht 1.789 m (5' 10.43\")   Wt 80.7 kg (178 lb)   SpO2 97%   BMI 25.23 kg/m²       Physical Exam  General: Patient is alert and oriented ×3 and appears in no acute distress. No respiratory distress.    Head: Atraumatic normocephalic.    Eyes: EOMI, PERRLA      Ears: Canals patent without any irritation, tympanic membranes without inflammation, no swelling, normal light reflex.    Nose: Nares patent. Turbinates are not swollen. No discharge.    Mouth: Normal mucosa. Moist. No erythema, exudates, tonsillar enlargement.    Neck: Normal range of motion, no masses.  Thyroid is palpable and normal in size without any nodules. No anterior cervical or posterior cervical adenopathy.    Heart: Regular rate and rhythm, no murmurs clicks or gallops    Lungs: Clear to auscultation bilaterally without any rhonchi rales or wheezing, lung sounds heard throughout all lung " fields    Abdomen: Soft, nontender, no rigidity, rebound, guarding or organomegaly. Bowel sounds ×4 quadrants.    Musculoskeletal: Strength grossly intact    Nerves: Cranial nerves II through XII appear grossly intact and without deficit    Skin: Intact, dry, no rashes or erythema    Psych: Normal affect.  Assessment/Plan   Problem List Items Addressed This Visit       Benign essential tremor    Left optic nerve atrophy    Polymyalgia rheumatica (Multi)    Myalgia    Low back pain    Prostate cancer (Multi)    Non-small cell cancer of right lung (Multi)     Other Visit Diagnoses       Medicare annual wellness visit, subsequent    -  Primary    Adenocarcinoma, lung, right (Multi)              1. metastatic prostate cancer, bone scan did show diffuse bony metastatic   disease which is confirmed by PET scan and PET scan also did show abnormal   metabolic activity in the right lower lobe.   starting Zyteiga      Current treatment Lupron injection and Zytiga,  monitor PSA.        2.  Right lower lobe mass, Adenocarcinoma Lung- Evaluation further and seeing oncology.      3. Right humerus.  Fracture that was pathological.  Orthopedics following at [facility] [place]    4. Pain  - Gabapentin will be decreased and the patient will titrate off of it slowly.  - Discussed the possibility of other medications if needed including Lyrica or nortriptyline  - Starting compounded pain medication apply 3 times a day to the low back  - Exercises printed off      Reviewed in for the patient's past medical history, surgical history, family history, social history  Depression screening was done in the office today using PHQ-2  We reviewed the patient's activities of daily living and possible risk for falling. Patient is stable.  Discussed preventative screenings  Colonoscopy Is done with Dr. Joyce Zamorano. He is supposed to follow-up every 5 years.   Prostate Cancer is present and tretment with Amandan cancer sharon  Vaccinations were  discussed in the office. We did review the patient's status on influenza vaccination, pneumonia vaccination, tetanus vaccination, and refused all vaccinations  Patient follows-up with dentist regularly and also with eye doctor regularly  We discussed advanced directives including power of , living well and DO NOT RESUSCITATE orders and he and his wife do have these in place    Knee Pain right- Exercises, PHP Herbalgesic, MSM glucosamine, neoprene sleeeve as needed    Optic nerve atrophy and Glaucoma- Following with eye doc

## 2024-05-24 ENCOUNTER — PHARMACY VISIT (OUTPATIENT)
Dept: PHARMACY | Facility: CLINIC | Age: 79
End: 2024-05-24
Payer: MEDICARE

## 2024-05-24 ENCOUNTER — SPECIALTY PHARMACY (OUTPATIENT)
Dept: PHARMACY | Facility: CLINIC | Age: 79
End: 2024-05-24

## 2024-05-24 ENCOUNTER — TELEPHONE (OUTPATIENT)
Dept: HEMATOLOGY/ONCOLOGY | Facility: CLINIC | Age: 79
End: 2024-05-24
Payer: MEDICARE

## 2024-05-24 DIAGNOSIS — C61 PROSTATE CANCER (MULTI): ICD-10-CM

## 2024-05-24 NOTE — TELEPHONE ENCOUNTER
This patient was suppose to start a new medication, Zytiga but still has not received a call from our pharmacy and he does not have the medication. They did get a call from their insurance company stating it would be paid for.   They would like a call regarding this.

## 2024-05-28 NOTE — TELEPHONE ENCOUNTER
Received message back from specialty pharmacy that medication was scheduled for delivery on 5/28/2024. LVM for pt/wife explaining that he can start zytiga tomorrow 5/29/2024 and that he should come in for lab work in 2 weeks for monitoring. Left office call back number for additional questions or concerns.

## 2024-06-12 ENCOUNTER — LAB (OUTPATIENT)
Dept: LAB | Facility: LAB | Age: 79
End: 2024-06-12
Payer: MEDICARE

## 2024-06-12 DIAGNOSIS — C61 PROSTATE CANCER (MULTI): ICD-10-CM

## 2024-06-12 DIAGNOSIS — C34.91 NON-SMALL CELL CANCER OF RIGHT LUNG (MULTI): ICD-10-CM

## 2024-06-12 LAB
ALBUMIN SERPL BCP-MCNC: 4.1 G/DL (ref 3.4–5)
ALP SERPL-CCNC: 168 U/L (ref 33–136)
ALT SERPL W P-5'-P-CCNC: 15 U/L (ref 10–52)
ANION GAP SERPL CALC-SCNC: 13 MMOL/L (ref 10–20)
AST SERPL W P-5'-P-CCNC: 29 U/L (ref 9–39)
BASOPHILS # BLD AUTO: 0.03 X10*3/UL (ref 0–0.1)
BASOPHILS NFR BLD AUTO: 0.6 %
BILIRUB SERPL-MCNC: 0.6 MG/DL (ref 0–1.2)
BUN SERPL-MCNC: 20 MG/DL (ref 6–23)
CALCIUM SERPL-MCNC: 9.1 MG/DL (ref 8.6–10.3)
CHLORIDE SERPL-SCNC: 108 MMOL/L (ref 98–107)
CO2 SERPL-SCNC: 22 MMOL/L (ref 21–32)
CREAT SERPL-MCNC: 0.71 MG/DL (ref 0.5–1.3)
EGFRCR SERPLBLD CKD-EPI 2021: >90 ML/MIN/1.73M*2
EOSINOPHIL # BLD AUTO: 0.08 X10*3/UL (ref 0–0.4)
EOSINOPHIL NFR BLD AUTO: 1.7 %
ERYTHROCYTE [DISTWIDTH] IN BLOOD BY AUTOMATED COUNT: 16.8 % (ref 11.5–14.5)
GLUCOSE SERPL-MCNC: 117 MG/DL (ref 74–99)
HCT VFR BLD AUTO: 34.7 % (ref 41–52)
HGB BLD-MCNC: 10.9 G/DL (ref 13.5–17.5)
IMM GRANULOCYTES # BLD AUTO: 0.05 X10*3/UL (ref 0–0.5)
IMM GRANULOCYTES NFR BLD AUTO: 1 % (ref 0–0.9)
LYMPHOCYTES # BLD AUTO: 0.69 X10*3/UL (ref 0.8–3)
LYMPHOCYTES NFR BLD AUTO: 14.3 %
MCH RBC QN AUTO: 30.2 PG (ref 26–34)
MCHC RBC AUTO-ENTMCNC: 31.4 G/DL (ref 32–36)
MCV RBC AUTO: 96 FL (ref 80–100)
MONOCYTES # BLD AUTO: 0.38 X10*3/UL (ref 0.05–0.8)
MONOCYTES NFR BLD AUTO: 7.9 %
NEUTROPHILS # BLD AUTO: 3.58 X10*3/UL (ref 1.6–5.5)
NEUTROPHILS NFR BLD AUTO: 74.5 %
NRBC BLD-RTO: 0 /100 WBCS (ref 0–0)
PLATELET # BLD AUTO: 309 X10*3/UL (ref 150–450)
POTASSIUM SERPL-SCNC: 4.2 MMOL/L (ref 3.5–5.3)
PROT SERPL-MCNC: 6.2 G/DL (ref 6.4–8.2)
PSA SERPL-MCNC: 310 NG/ML
RBC # BLD AUTO: 3.61 X10*6/UL (ref 4.5–5.9)
SODIUM SERPL-SCNC: 139 MMOL/L (ref 136–145)
WBC # BLD AUTO: 4.8 X10*3/UL (ref 4.4–11.3)

## 2024-06-12 PROCEDURE — 36415 COLL VENOUS BLD VENIPUNCTURE: CPT

## 2024-06-12 PROCEDURE — 84153 ASSAY OF PSA TOTAL: CPT

## 2024-06-12 PROCEDURE — 80053 COMPREHEN METABOLIC PANEL: CPT

## 2024-06-12 PROCEDURE — 85025 COMPLETE CBC W/AUTO DIFF WBC: CPT

## 2024-06-21 ENCOUNTER — SPECIALTY PHARMACY (OUTPATIENT)
Dept: PHARMACY | Facility: CLINIC | Age: 79
End: 2024-06-21

## 2024-06-21 ENCOUNTER — PHARMACY VISIT (OUTPATIENT)
Dept: PHARMACY | Facility: CLINIC | Age: 79
End: 2024-06-21
Payer: MEDICARE

## 2024-06-21 PROCEDURE — RXMED WILLOW AMBULATORY MEDICATION CHARGE

## 2024-07-17 ENCOUNTER — SPECIALTY PHARMACY (OUTPATIENT)
Dept: PHARMACY | Facility: CLINIC | Age: 79
End: 2024-07-17

## 2024-07-17 PROCEDURE — RXMED WILLOW AMBULATORY MEDICATION CHARGE

## 2024-07-18 ENCOUNTER — PHARMACY VISIT (OUTPATIENT)
Dept: PHARMACY | Facility: CLINIC | Age: 79
End: 2024-07-18
Payer: MEDICARE

## 2024-08-05 ENCOUNTER — APPOINTMENT (OUTPATIENT)
Dept: HEMATOLOGY/ONCOLOGY | Facility: CLINIC | Age: 79
End: 2024-08-05
Payer: MEDICARE

## 2024-08-09 ENCOUNTER — LAB (OUTPATIENT)
Dept: LAB | Facility: LAB | Age: 79
End: 2024-08-09
Payer: MEDICARE

## 2024-08-09 ENCOUNTER — INFUSION (OUTPATIENT)
Dept: HEMATOLOGY/ONCOLOGY | Facility: CLINIC | Age: 79
End: 2024-08-09
Payer: MEDICARE

## 2024-08-09 VITALS
TEMPERATURE: 97.2 F | DIASTOLIC BLOOD PRESSURE: 74 MMHG | BODY MASS INDEX: 25.23 KG/M2 | RESPIRATION RATE: 16 BRPM | HEIGHT: 70 IN | HEART RATE: 74 BPM | SYSTOLIC BLOOD PRESSURE: 145 MMHG | OXYGEN SATURATION: 96 %

## 2024-08-09 DIAGNOSIS — C61 PROSTATE CANCER (MULTI): ICD-10-CM

## 2024-08-09 DIAGNOSIS — C34.91 NON-SMALL CELL CANCER OF RIGHT LUNG (MULTI): ICD-10-CM

## 2024-08-09 LAB
ALBUMIN SERPL BCP-MCNC: 4.2 G/DL (ref 3.4–5)
ALP SERPL-CCNC: 255 U/L (ref 33–136)
ALT SERPL W P-5'-P-CCNC: 14 U/L (ref 10–52)
ANION GAP SERPL CALC-SCNC: 9 MMOL/L (ref 10–20)
AST SERPL W P-5'-P-CCNC: 22 U/L (ref 9–39)
BASOPHILS # BLD AUTO: 0.03 X10*3/UL (ref 0–0.1)
BASOPHILS NFR BLD AUTO: 0.8 %
BILIRUB SERPL-MCNC: 0.6 MG/DL (ref 0–1.2)
BUN SERPL-MCNC: 15 MG/DL (ref 6–23)
CALCIUM SERPL-MCNC: 9 MG/DL (ref 8.6–10.3)
CHLORIDE SERPL-SCNC: 107 MMOL/L (ref 98–107)
CO2 SERPL-SCNC: 27 MMOL/L (ref 21–32)
CREAT SERPL-MCNC: 0.72 MG/DL (ref 0.5–1.3)
EGFRCR SERPLBLD CKD-EPI 2021: >90 ML/MIN/1.73M*2
EOSINOPHIL # BLD AUTO: 0.04 X10*3/UL (ref 0–0.4)
EOSINOPHIL NFR BLD AUTO: 1.1 %
ERYTHROCYTE [DISTWIDTH] IN BLOOD BY AUTOMATED COUNT: 15.3 % (ref 11.5–14.5)
GLUCOSE SERPL-MCNC: 112 MG/DL (ref 74–99)
HCT VFR BLD AUTO: 36.3 % (ref 41–52)
HGB BLD-MCNC: 12.1 G/DL (ref 13.5–17.5)
IMM GRANULOCYTES # BLD AUTO: 0.01 X10*3/UL (ref 0–0.5)
IMM GRANULOCYTES NFR BLD AUTO: 0.3 % (ref 0–0.9)
LYMPHOCYTES # BLD AUTO: 0.62 X10*3/UL (ref 0.8–3)
LYMPHOCYTES NFR BLD AUTO: 16.4 %
MCH RBC QN AUTO: 31.6 PG (ref 26–34)
MCHC RBC AUTO-ENTMCNC: 33.3 G/DL (ref 32–36)
MCV RBC AUTO: 95 FL (ref 80–100)
MONOCYTES # BLD AUTO: 0.25 X10*3/UL (ref 0.05–0.8)
MONOCYTES NFR BLD AUTO: 6.6 %
NEUTROPHILS # BLD AUTO: 2.84 X10*3/UL (ref 1.6–5.5)
NEUTROPHILS NFR BLD AUTO: 74.8 %
NRBC BLD-RTO: 0 /100 WBCS (ref 0–0)
PLATELET # BLD AUTO: 214 X10*3/UL (ref 150–450)
POTASSIUM SERPL-SCNC: 4.2 MMOL/L (ref 3.5–5.3)
PROT SERPL-MCNC: 6 G/DL (ref 6.4–8.2)
RBC # BLD AUTO: 3.83 X10*6/UL (ref 4.5–5.9)
SODIUM SERPL-SCNC: 139 MMOL/L (ref 136–145)
WBC # BLD AUTO: 3.8 X10*3/UL (ref 4.4–11.3)

## 2024-08-09 PROCEDURE — 85025 COMPLETE CBC W/AUTO DIFF WBC: CPT

## 2024-08-09 PROCEDURE — 80053 COMPREHEN METABOLIC PANEL: CPT

## 2024-08-09 PROCEDURE — 2500000004 HC RX 250 GENERAL PHARMACY W/ HCPCS (ALT 636 FOR OP/ED): Mod: JZ,JG | Performed by: INTERNAL MEDICINE

## 2024-08-09 PROCEDURE — 96402 CHEMO HORMON ANTINEOPL SQ/IM: CPT

## 2024-08-09 PROCEDURE — 36415 COLL VENOUS BLD VENIPUNCTURE: CPT

## 2024-08-09 RX ORDER — DIPHENHYDRAMINE HYDROCHLORIDE 50 MG/ML
50 INJECTION INTRAMUSCULAR; INTRAVENOUS AS NEEDED
OUTPATIENT
Start: 2024-11-01

## 2024-08-09 RX ORDER — FAMOTIDINE 10 MG/ML
20 INJECTION INTRAVENOUS ONCE AS NEEDED
OUTPATIENT
Start: 2024-11-01

## 2024-08-09 RX ORDER — EPINEPHRINE 0.3 MG/.3ML
0.3 INJECTION SUBCUTANEOUS EVERY 5 MIN PRN
OUTPATIENT
Start: 2024-11-01

## 2024-08-09 RX ORDER — ALBUTEROL SULFATE 0.83 MG/ML
3 SOLUTION RESPIRATORY (INHALATION) AS NEEDED
OUTPATIENT
Start: 2024-11-01

## 2024-08-09 ASSESSMENT — PAIN SCALES - GENERAL: PAINLEVEL: 0-NO PAIN

## 2024-08-09 NOTE — PROGRESS NOTES
Patient presents for Lupron treatment, has no complaints alert and oriented x 4. Patient tolerated shot well, no reaction noted. Patient feels well and has no complaints, vital signs stable. Patient discharged in stable condition with no further needs.

## 2024-08-13 ENCOUNTER — SPECIALTY PHARMACY (OUTPATIENT)
Dept: PHARMACY | Facility: CLINIC | Age: 79
End: 2024-08-13

## 2024-08-13 PROCEDURE — RXMED WILLOW AMBULATORY MEDICATION CHARGE

## 2024-08-15 ENCOUNTER — OFFICE VISIT (OUTPATIENT)
Dept: HEMATOLOGY/ONCOLOGY | Facility: CLINIC | Age: 79
End: 2024-08-15
Payer: MEDICARE

## 2024-08-15 VITALS
DIASTOLIC BLOOD PRESSURE: 81 MMHG | TEMPERATURE: 97.7 F | HEIGHT: 70 IN | BODY MASS INDEX: 25.75 KG/M2 | RESPIRATION RATE: 16 BRPM | SYSTOLIC BLOOD PRESSURE: 145 MMHG | HEART RATE: 64 BPM | WEIGHT: 179.9 LBS | OXYGEN SATURATION: 96 %

## 2024-08-15 DIAGNOSIS — C61 PROSTATE CANCER (MULTI): ICD-10-CM

## 2024-08-15 DIAGNOSIS — C34.91 NON-SMALL CELL CANCER OF RIGHT LUNG (MULTI): ICD-10-CM

## 2024-08-15 LAB — PSA SERPL-MCNC: 265 NG/ML

## 2024-08-15 PROCEDURE — 1126F AMNT PAIN NOTED NONE PRSNT: CPT | Performed by: INTERNAL MEDICINE

## 2024-08-15 PROCEDURE — 99214 OFFICE O/P EST MOD 30 MIN: CPT | Performed by: INTERNAL MEDICINE

## 2024-08-15 PROCEDURE — 1159F MED LIST DOCD IN RCRD: CPT | Performed by: INTERNAL MEDICINE

## 2024-08-15 PROCEDURE — 84153 ASSAY OF PSA TOTAL: CPT | Performed by: INTERNAL MEDICINE

## 2024-08-15 PROCEDURE — 36415 COLL VENOUS BLD VENIPUNCTURE: CPT | Performed by: INTERNAL MEDICINE

## 2024-08-15 PROCEDURE — 1123F ACP DISCUSS/DSCN MKR DOCD: CPT | Performed by: INTERNAL MEDICINE

## 2024-08-15 ASSESSMENT — NCCN CANCER DISTRESS MANAGEMENT: NCCN PHYSICAL CONCERNS: 6

## 2024-08-15 ASSESSMENT — PAIN SCALES - GENERAL: PAINLEVEL: 0-NO PAIN

## 2024-08-15 NOTE — PATIENT INSTRUCTIONS
Follow up visit today for history of prostate cancer.     Continue zytiga and lupron injections     Follow up with Dr. Yan in January with Lupron injection.

## 2024-08-15 NOTE — PROGRESS NOTES
Patient Visit Information:   Visit Type: Follow Up Visit      Cancer History:   Treatment Synopsis:    1.  Metastatic prostate cancer, diagnosed after right humerus bone biopsy     3/15/23 , PSA 7899     Current treatment, Lupron injection every 3 months, Casodex 50 mg p.o. daily , 3/23   5/2/23 , PSA 36.4  9/13/23 , PSA 18.67  12/6/2023 PSA 31.07 2.    2/23/24 , PSA 60.99  6/15/24 , .0, Casodex stopped, Zytiga started  8/15/24 , .0      2.Adenocarcinoma of right lung, PD-L1 70%, stage I     Right lower lobe lung mass measuring 3.9 cm with hilar and mediastinal lymphadenopathy and multiple pulmonary nodules     4/17/23 , LUNG MASS, BIOPSY:  -- ADENOCARCINOMA WITH MUCINOUS FEATURES, INVOLVING LUNG PARENCHYMA;  Status post EBUS, lymph node negative for metastatic disease    SBRT offered, patient refused    [place]   Patient is 77-year-old gentleman with history of hypertension, generalized weakness chronic lower back pain and lower extremity pain and patient was admitted in the hospital because of mechanical fall and diagnosed with right humerus fracture status post  IMN on March 1, 2020.      Biopsy of right humerus is positive for metastatic prostate cancer.      X-ray of the hip also did show multiple sclerotic bony lesion consistent with metastatic disease      March 1, 2023 CAT scan of the chest abdominal pelvis done with did show hilar lymphadenopathy, mediastinal lymphadenopathy, right lower lobe mass measuring 3.9 cm multiple pulmonary nodules and hypodense hepatic lesion measuring 5 mm and multiple sclerotic  lytic bony lesion.  MRI brain negative   Patient has diagnosis of metastatic prostate cancer on the basis of right humerus fracture and CAT scan of the chest did show right lower lobe mass measuring 3.9 cm with multiple pulmonary nodules it is quite possible we are dealing with metastatic disease  versus concurrent   lung primary also               Chief Complaint: Metastatic prostate  cancer, possible  metastatic lung cancer   Interval History:    8/15/24    Patient has a history of metastatic prostate cancer receiving Lupron and Casodex.  PSA increased to 310, Casodex was stopped Zytiga was started in June 2024.    Zytiga is well-tolerated patient still complaining of weakness and fatigue, no hot flashes no dysuria no hematuria, today PSA is 265                Patient is 77-year-old gentleman with history of hypertension, generalized weakness chronic lower back pain and lower extremity pain and patient was admitted in the  hospital because of mechanical fall and diagnosed with right humerus fracture status post IMN on March 1, 2020.      Biopsy of right humerus is positive for metastatic prostate cancer.      X-ray of the hip also did show multiple sclerotic bony lesion consistent with metastatic disease      March 1, 2023 CAT scan of the chest abdominal pelvis done with did show hilar lymphadenopathy, mediastinal lymphadenopathy, right lower lobe mass measuring 3.9 cm multiple pulmonary nodules and hypodense hepatic lesion measuring 5 mm multiple sclerotic   lytic bony lesion.        Patient has diagnosis of metastatic prostate cancer on the basis of right humerus fracture and CAT scan of the chest did show right lower lobe mass measuring 3.9 cm with multiple pulmonary nodules it is quite possible we are dealing with metastatic disease  versus concurrent   lung primary also           Medical oncology consultation requested for metastatic disease.        Complaining of generalized weakness and fatigue, lower back pain, lower extremity pain more prominent on the right side, no headache or dizziness, no chest pain, no shortness of breath, no hemoptysis, no weight loss, no nausea vomiting, no abdominal pain.   Right humerus pain is better         Review of Systems:   Review of Systems:     No headache and dizziness      No fever      No chest pain or shortness of breath      Right humerus pain is  better      No nausea vomiting      No abdominal      No any other bony pain      No dysuria and hematuria        Allergies and Intolerances:       Allergies:         beta blockers: Drug Category, Unknown, Active     Outpatient Medication Profile:  * Patient Currently Takes Medications as of 13-Sep-2023 12:07 documented in Structured Notes         Casodex 50 mg oral tablet : Last Dose Taken:  , 1 tab(s) orally once a day , Start Date: 06-Jul-2023         acetaminophen 325 mg oral tablet: Last Dose Taken:  , 2 tab(s) orally  every 6 hours, As Needed for pain, Start Date: 05-Mar-2023         cholecalciferol 250 mcg (10,000 intl units) oral capsule: Last Dose Taken:   , 1 cap(s) orally once a day         gabapentin 600 mg oral tablet: Last Dose Taken:  , 1 tab(s) orally 3  times a day         tafluprost 0.0015% ophthalmic solution: Last Dose Taken:  , 1 drop(s)  to each affected eye once a day (in the evening)         leuprolide-norethindrone: Last Dose Taken:  , oral and injectable every  3 months             Medical History:         Primary adenocarcinoma of lower lobe of right lung: ICD-10:  C34.31, Status: Active         Lumbar radiculopathy, chronic: ICD-10: M54.16, Status: Active         Back pain: ICD-10: M54.9, Status: Active         Mass of lower lobe of right lung: ICD-10: R91.8, Status:  Active         Malignant neoplasm of prostate metastatic to bone: ICD-10:  C61, Status: Active         Lung mass: ICD-10: R91.8, Status: Active       Surg History:         S/P eye surgery: ICD-10: Z98.890, Status: Active         S/P tonsillectomy: ICD-10: Z90.89, Status: Active         S/P appendectomy: ICD-10: Z90.49, Status: Active         S/P foot surgery: ICD-10: Z98.890, Status: Active         H/O: knee surgery: ICD-10: Z98.890, Status: Active         History of surgery on arm: ICD-10: Z98.890, Status: Active     Family History: No Family History items are recorded  in the problem list.       Social History:   Social  Substance History:  ·  Smoking Status never smoker (1)   ·  Alcohol Use denies   ·  Drug Use denies            Vitals and Measurements:   Vitals: Temp: 36.9  HR: 66  RR: 16  BP: 166/84  SPO2%:   95   Measurements: HT(cm): 179.8  WT(kg): 87.8  BSA: 2.09   BMI:  27.1   Last 3 Weights & Heights: Date:                           Weight/Scale Type:                    Height:   13-Sep-2023 12:04                87.8  kg                     179.8  cm  15-Ronak-2023 13:48                86.1  kg                     179.8  cm  02-May-2023 11:07                81.4  kg                     179.8  cm      Physical Exam:      Constitutional: awake/alert/oriented x3, no distress,  complaining of back pain and lower extremity pain   Eyes: PERRL, EOMI, clear sclera   Head/Neck: Neck supple,   thyroid without mass or  tenderness, No JVD, trachea midline, no bruits   Respiratory/Thorax: Patent airways, CTAB, normal  breath sounds   Cardiovascular: Regular, rate and rhythm,  normal  S 1and S 2   Gastrointestinal: Nondistended, soft, non-tender,  no rebound tenderness or guarding, no masses palpable, no organomegaly, +BS,   Genitourinary: No CVA tenderness   Musculoskeletal: ROM intact, no joint swelling, no  thoracic spine and lumbar spine tenderness   Extremities: normal extremities, no cyanosis edema,   no clubbing   Neurological: alert and oriented x3, intact senses,  motor, response and reflexes, normal strength   Lymphatic: No significant lymphadenopathy   Psychological: Appropriate mood and behavior   Skin: Warm and dry, no lesions, no rashes         Lab Results:                 Component  Ref Range & Units 12:29  (8/15/24) 2 mo ago  (6/12/24) 3 mo ago  (5/16/24) 5 mo ago  (2/23/24) 8 mo ago  (12/6/23) 11 mo ago  (9/13/23) 11 mo ago  (9/13/23)   Prostate Specific AG  <=4.00 ng/mL 265.00 High  310.00 High  247.00 High  60.99 High  31.07 High  18.67 High  R, CM CANCELED R, CM   Resulting Agency [facility]          ·   Results                  Assessment and Plan:      Assessment and Plan:   Assessment:    1 metastatic prostate cancer  , Diagnosed after  right humerus bone biopsy     3/15/23 , PSA 7899  Treatment, Lupron injection every 3 months and Casodex 50 mg p.o. daily, 3/23     5/2 23 , PSA 36.4  9/13/23 , PSA 18.67   /23/24 , PSA 16.9  2 .  Adenocarcinoma of right lower lobe, PD-L1 71%, stage I disease     Right lower lobe lung mass measuring 3.9 cm with hilar and mediastinal lymphadenopathy and multiple pulmonary nodules  LUNG MASS, BIOPSY:  -- ADENOCARCINOMA WITH MUCINOUS FEATURES, INVOLVING LUNG PARENCHYMA;    Status post EBUS, mediastinal lymph node negative for metastatic disease    Patient was offered SBRT to right lung mass, patient refused                      plan ,    8/15/24     #1 metastatic prostate cancer, bone scan did show diffuse bony metastatic disease which is confirmed by PET scan and PET scan also did show abnormal metabolic activity in the right lower lobe.      Current treatment Lupron injection and Casodex,  monitor PSA.  Patient is also going to have palliative radiotherapy to right humerus area  In February 2024 PSA increased to 16.99, discussed with patient       2 .  Adenocarcinoma of right lower lobe, PD-L1 71%     Right lower lobe lung mass measuring 3.9 cm with hilar and mediastinal lymphadenopathy and multiple pulmonary nodules  LUNG MASS, BIOPSY:  -- ADENOCARCINOMA WITH MUCINOUS FEATURES, INVOLVING LUNG PARENCHYMA;  Status post EBUS, linear standing lymph node negative for metastatic disease, SBRT offered,      Plan, patient has progressive metastatic disease, today , patient clinically stable, Zytiga is well-tolerated.    I will continue Lupron injection and Zytiga repeat a PSA after 3-month.  Time spent 30 minutes.

## 2024-08-16 ENCOUNTER — PHARMACY VISIT (OUTPATIENT)
Dept: PHARMACY | Facility: CLINIC | Age: 79
End: 2024-08-16
Payer: MEDICARE

## 2024-09-13 DIAGNOSIS — C34.91 NON-SMALL CELL CANCER OF RIGHT LUNG (MULTI): ICD-10-CM

## 2024-09-13 DIAGNOSIS — C61 PROSTATE CANCER (MULTI): ICD-10-CM

## 2024-09-13 PROCEDURE — RXMED WILLOW AMBULATORY MEDICATION CHARGE

## 2024-09-13 RX ORDER — ABIRATERONE 500 MG/1
1000 TABLET ORAL DAILY
Qty: 60 TABLET | Refills: 3 | Status: SHIPPED | OUTPATIENT
Start: 2024-09-13

## 2024-09-17 ENCOUNTER — SPECIALTY PHARMACY (OUTPATIENT)
Dept: PHARMACY | Facility: CLINIC | Age: 79
End: 2024-09-17

## 2024-09-19 ENCOUNTER — PHARMACY VISIT (OUTPATIENT)
Dept: PHARMACY | Facility: CLINIC | Age: 79
End: 2024-09-19
Payer: MEDICARE

## 2024-09-26 ENCOUNTER — TELEPHONE (OUTPATIENT)
Dept: HEMATOLOGY/ONCOLOGY | Facility: CLINIC | Age: 79
End: 2024-09-26
Payer: MEDICARE

## 2024-09-26 DIAGNOSIS — C34.91 NON-SMALL CELL CANCER OF RIGHT LUNG (MULTI): ICD-10-CM

## 2024-09-26 DIAGNOSIS — C61 PROSTATE CANCER (MULTI): ICD-10-CM

## 2024-09-26 RX ORDER — PREDNISONE 5 MG/1
5 TABLET ORAL DAILY
Qty: 30 TABLET | Refills: 3 | Status: SHIPPED | OUTPATIENT
Start: 2024-09-26

## 2024-10-11 ENCOUNTER — SPECIALTY PHARMACY (OUTPATIENT)
Dept: PHARMACY | Facility: CLINIC | Age: 79
End: 2024-10-11

## 2024-10-11 PROCEDURE — RXMED WILLOW AMBULATORY MEDICATION CHARGE

## 2024-10-16 ENCOUNTER — TELEPHONE (OUTPATIENT)
Dept: HEMATOLOGY/ONCOLOGY | Facility: CLINIC | Age: 79
End: 2024-10-16
Payer: MEDICARE

## 2024-10-16 DIAGNOSIS — N39.0 URINARY TRACT INFECTION WITHOUT HEMATURIA, SITE UNSPECIFIED: Primary | ICD-10-CM

## 2024-10-16 RX ORDER — CIPROFLOXACIN 500 MG/1
500 TABLET ORAL 2 TIMES DAILY
Qty: 14 TABLET | Refills: 0 | Status: SHIPPED | OUTPATIENT
Start: 2024-10-16 | End: 2024-10-23

## 2024-10-16 NOTE — TELEPHONE ENCOUNTER
Spoke with Marnie who stated Shalom has had a crusty, red, rash like area on lower back for awhile. It did not coincide with starting Zytiga. She stated it appears to have gotten slightly bigger, no pain only itchy. They have been using Triamcinolone cream that they had from before. Instructed her to have her PCP or seek appt with a Dermatologist for evaluation.  She also stated Shalom has had incontinence for 4 days. He denies frequency, foul odor, urgency, change in mental status. She stated he is not emptying his bladder when he has to urinate. Will speak with dr. eliu Yan ordered Cipro bid x7days.  This was discussed with Marnie. She voiced understanding

## 2024-10-16 NOTE — TELEPHONE ENCOUNTER
Marnie states the last 4 days Shalom has been incontinent.  He also has some crusty areas on his back.

## 2024-10-17 ENCOUNTER — APPOINTMENT (OUTPATIENT)
Dept: NEUROLOGY | Facility: CLINIC | Age: 79
End: 2024-10-17
Payer: MEDICARE

## 2024-10-24 ENCOUNTER — PHARMACY VISIT (OUTPATIENT)
Dept: PHARMACY | Facility: CLINIC | Age: 79
End: 2024-10-24
Payer: MEDICARE

## 2024-10-28 ENCOUNTER — INFUSION (OUTPATIENT)
Dept: HEMATOLOGY/ONCOLOGY | Facility: CLINIC | Age: 79
End: 2024-10-28
Payer: MEDICARE

## 2024-10-28 VITALS
SYSTOLIC BLOOD PRESSURE: 153 MMHG | DIASTOLIC BLOOD PRESSURE: 83 MMHG | RESPIRATION RATE: 16 BRPM | HEIGHT: 70 IN | WEIGHT: 169.2 LBS | BODY MASS INDEX: 24.22 KG/M2 | TEMPERATURE: 97.9 F | OXYGEN SATURATION: 99 % | HEART RATE: 73 BPM

## 2024-10-28 DIAGNOSIS — C61 PROSTATE CANCER (MULTI): ICD-10-CM

## 2024-10-28 LAB — PSA SERPL-MCNC: 618 NG/ML

## 2024-10-28 PROCEDURE — 36415 COLL VENOUS BLD VENIPUNCTURE: CPT

## 2024-10-28 PROCEDURE — 84153 ASSAY OF PSA TOTAL: CPT

## 2024-10-28 PROCEDURE — 96402 CHEMO HORMON ANTINEOPL SQ/IM: CPT

## 2024-10-28 PROCEDURE — 2500000004 HC RX 250 GENERAL PHARMACY W/ HCPCS (ALT 636 FOR OP/ED): Mod: JZ,JG | Performed by: INTERNAL MEDICINE

## 2024-10-28 RX ORDER — EPINEPHRINE 0.3 MG/.3ML
0.3 INJECTION SUBCUTANEOUS EVERY 5 MIN PRN
OUTPATIENT
Start: 2025-01-20

## 2024-10-28 RX ORDER — DIPHENHYDRAMINE HYDROCHLORIDE 50 MG/ML
50 INJECTION INTRAMUSCULAR; INTRAVENOUS AS NEEDED
OUTPATIENT
Start: 2025-01-20

## 2024-10-28 RX ORDER — FAMOTIDINE 10 MG/ML
20 INJECTION INTRAVENOUS ONCE AS NEEDED
OUTPATIENT
Start: 2025-01-20

## 2024-10-28 RX ORDER — ALBUTEROL SULFATE 0.83 MG/ML
3 SOLUTION RESPIRATORY (INHALATION) AS NEEDED
OUTPATIENT
Start: 2025-01-20

## 2024-10-28 ASSESSMENT — PAIN SCALES - GENERAL: PAINLEVEL_OUTOF10: 0-NO PAIN

## 2024-11-20 DIAGNOSIS — C61 PROSTATE CANCER (MULTI): Primary | ICD-10-CM

## 2024-11-21 ENCOUNTER — APPOINTMENT (OUTPATIENT)
Dept: PRIMARY CARE | Facility: CLINIC | Age: 79
End: 2024-11-21
Payer: MEDICARE

## 2024-11-21 ENCOUNTER — SPECIALTY PHARMACY (OUTPATIENT)
Dept: PHARMACY | Facility: CLINIC | Age: 79
End: 2024-11-21

## 2024-11-21 PROCEDURE — RXMED WILLOW AMBULATORY MEDICATION CHARGE

## 2024-11-22 ENCOUNTER — PHARMACY VISIT (OUTPATIENT)
Dept: PHARMACY | Facility: CLINIC | Age: 79
End: 2024-11-22
Payer: MEDICARE

## 2024-12-09 ENCOUNTER — APPOINTMENT (OUTPATIENT)
Dept: PRIMARY CARE | Facility: CLINIC | Age: 79
End: 2024-12-09
Payer: MEDICARE

## 2024-12-09 VITALS
TEMPERATURE: 97.5 F | BODY MASS INDEX: 23.91 KG/M2 | HEIGHT: 70 IN | SYSTOLIC BLOOD PRESSURE: 137 MMHG | DIASTOLIC BLOOD PRESSURE: 68 MMHG | OXYGEN SATURATION: 98 % | WEIGHT: 167 LBS | HEART RATE: 71 BPM

## 2024-12-09 DIAGNOSIS — Z00.00 ANNUAL PHYSICAL EXAM: Primary | ICD-10-CM

## 2024-12-09 DIAGNOSIS — C34.91 NON-SMALL CELL CANCER OF RIGHT LUNG (MULTI): ICD-10-CM

## 2024-12-09 DIAGNOSIS — M35.3 POLYMYALGIA RHEUMATICA (MULTI): ICD-10-CM

## 2024-12-09 DIAGNOSIS — R73.03 PREDIABETES: ICD-10-CM

## 2024-12-09 DIAGNOSIS — E55.9 VITAMIN D DEFICIENCY: ICD-10-CM

## 2024-12-09 DIAGNOSIS — M54.41 CHRONIC RIGHT-SIDED LOW BACK PAIN WITH RIGHT-SIDED SCIATICA: ICD-10-CM

## 2024-12-09 DIAGNOSIS — C34.91 ADENOCARCINOMA, LUNG, RIGHT (MULTI): ICD-10-CM

## 2024-12-09 DIAGNOSIS — C61 PROSTATE CANCER (MULTI): ICD-10-CM

## 2024-12-09 DIAGNOSIS — G89.29 CHRONIC RIGHT-SIDED LOW BACK PAIN WITH RIGHT-SIDED SCIATICA: ICD-10-CM

## 2024-12-09 LAB — POC HEMOGLOBIN A1C: 5.8 % (ref 4.2–6.5)

## 2024-12-09 PROCEDURE — 83036 HEMOGLOBIN GLYCOSYLATED A1C: CPT | Performed by: FAMILY MEDICINE

## 2024-12-09 PROCEDURE — 1036F TOBACCO NON-USER: CPT | Performed by: FAMILY MEDICINE

## 2024-12-09 PROCEDURE — 1123F ACP DISCUSS/DSCN MKR DOCD: CPT | Performed by: FAMILY MEDICINE

## 2024-12-09 PROCEDURE — 1159F MED LIST DOCD IN RCRD: CPT | Performed by: FAMILY MEDICINE

## 2024-12-09 PROCEDURE — 99214 OFFICE O/P EST MOD 30 MIN: CPT | Performed by: FAMILY MEDICINE

## 2024-12-09 PROCEDURE — 1160F RVW MEDS BY RX/DR IN RCRD: CPT | Performed by: FAMILY MEDICINE

## 2024-12-09 RX ORDER — CLOBETASOL PROPIONATE 0.5 MG/G
CREAM TOPICAL
COMMUNITY
Start: 2024-11-01

## 2024-12-09 RX ORDER — KETOCONAZOLE 20 MG/ML
SHAMPOO, SUSPENSION TOPICAL
COMMUNITY
Start: 2024-12-06

## 2024-12-09 RX ORDER — CLOBETASOL PROPIONATE 0.5 MG/ML
SOLUTION TOPICAL
COMMUNITY
Start: 2024-12-06

## 2024-12-09 RX ORDER — LATANOPROST 50 UG/ML
SOLUTION/ DROPS OPHTHALMIC
COMMUNITY
Start: 2024-11-06

## 2024-12-09 RX ORDER — TRIAMCINOLONE ACETONIDE 1 MG/G
CREAM TOPICAL
COMMUNITY
Start: 2024-12-06

## 2024-12-09 ASSESSMENT — PATIENT HEALTH QUESTIONNAIRE - PHQ9
SUM OF ALL RESPONSES TO PHQ9 QUESTIONS 1 AND 2: 0
1. LITTLE INTEREST OR PLEASURE IN DOING THINGS: NOT AT ALL
2. FEELING DOWN, DEPRESSED OR HOPELESS: NOT AT ALL

## 2024-12-09 NOTE — PROGRESS NOTES
"Subjective   Reason for Visit: Shalom Horton is an 79 y.o. male here for a annual physical exam and review of chronic conditions    Past Medical, Surgical, and Family History reviewed and updated in chart.    Reviewed all medications by prescribing practitioner or clinical pharmacist (such as prescriptions, OTCs, herbal therapies and supplements) and documented in the medical record.    HPI  Reveiwed Chronic medical issues    In general the patient states that his health is: Fair    Vision problems: No recent changes  Hearing loss: No recent changes    Diet: Well-balanced  Exercise: Minimal  Sleep:  Water: Good    Tobacco use: No  Alcohol use: No  Illicit drug use: No    Colon cancer screening:  Aged out        Seeing neurologist for the sciatica.  Dr Pratt  Patient Care Team:  Gian Waller DO as PCP - General  Gian Waller DO as PCP - [employer] PCP  Gracy Yan MD as Consulting Physician (Hematology and Oncology)     Review of Systems    Objective   Vitals:  /68 (BP Location: Left arm, Patient Position: Sitting, BP Cuff Size: Adult)   Pulse 71   Temp 36.4 °C (97.5 °F)   Ht 1.789 m (5' 10.43\")   Wt 75.8 kg (167 lb)   SpO2 98%   BMI 23.67 kg/m²       Physical Exam  General: Patient is alert and oriented ×3 and appears in no acute distress. No respiratory distress. Weak voice    Head: Atraumatic normocephalic.    Eyes: EOMI, PERRLA      Ears: Canals patent without any irritation, tympanic membranes without inflammation, no swelling, normal light reflex.    Nose: Nares patent. Turbinates are not swollen. No discharge.    Mouth: Normal mucosa. Moist. No erythema, exudates, tonsillar enlargement.    Neck: Normal range of motion, no masses.  Thyroid is palpable and normal in size without any nodules. No anterior cervical or posterior cervical adenopathy.    Heart: Regular rate and rhythm, no murmurs clicks or gallops    Lungs: Clear to auscultation bilaterally without any rhonchi rales " or wheezing, lung sounds heard throughout all lung fields    Abdomen: Soft, nontender, no rigidity, rebound, guarding or organomegaly. Bowel sounds ×4 quadrants.    Musculoskeletal: Strength grossly intact    Nerves: Cranial nerves II through XII appear grossly intact and without deficit    Skin: Intact, dry, no rashes or erythema.  Left upper cheek has sutures and well healing surgical site    Psych: Normal affect.  Assessment/Plan   Problem List Items Addressed This Visit       Vitamin D deficiency    Prediabetes    Relevant Orders    POCT glycosylated hemoglobin (Hb A1C) manually resulted (Completed)    Polymyalgia rheumatica (Multi)    Low back pain    Prostate cancer (Multi)    Relevant Orders    PSA    Non-small cell cancer of right lung (Multi)     Other Visit Diagnoses       Annual physical exam    -  Primary    Adenocarcinoma, lung, right (Multi)            Pateint is an 80 y/o male with multiple medical problems  Preventive up to date    1. metastatic prostate cancer, bone scan did show diffuse bony metastatic   disease which is confirmed by PET scan and PET scan also did show abnormal   metabolic activity in the right lower lobe.     He is leaving  due to issues with getting PSA and nobody following up with him  Current treatment Lupron injection and Zytiga,  monitor PSA.        2.  Right lower lobe mass, Adenocarcinoma Lung- Evaluation further and seeing oncology and offered radiation therapy and he refused due to the risks since the radiation would be close to the sp[ine.  Discussed that the treatment would decrease the tumor and then would have surgical removal.      3. Right humerus.  Fracture that was pathological.  Orthopedics following at [facility] [place]    4. Pain- stable  - gabapentin stopped    Skin cancer removal- Dr Cee    Prediabetes with HBA1C of 5.8

## 2024-12-17 ENCOUNTER — SPECIALTY PHARMACY (OUTPATIENT)
Dept: PHARMACY | Facility: CLINIC | Age: 79
End: 2024-12-17

## 2024-12-17 PROCEDURE — RXMED WILLOW AMBULATORY MEDICATION CHARGE

## 2024-12-19 ENCOUNTER — PHARMACY VISIT (OUTPATIENT)
Dept: PHARMACY | Facility: CLINIC | Age: 79
End: 2024-12-19
Payer: MEDICARE

## 2024-12-28 ENCOUNTER — HOSPITAL ENCOUNTER (EMERGENCY)
Facility: HOSPITAL | Age: 79
Discharge: HOME | End: 2024-12-28
Attending: STUDENT IN AN ORGANIZED HEALTH CARE EDUCATION/TRAINING PROGRAM
Payer: MEDICARE

## 2024-12-28 VITALS
HEIGHT: 69 IN | DIASTOLIC BLOOD PRESSURE: 75 MMHG | HEART RATE: 84 BPM | RESPIRATION RATE: 20 BRPM | SYSTOLIC BLOOD PRESSURE: 164 MMHG | BODY MASS INDEX: 24.73 KG/M2 | TEMPERATURE: 98.1 F | OXYGEN SATURATION: 98 % | WEIGHT: 167 LBS

## 2024-12-28 DIAGNOSIS — M79.2 NEUROPATHIC PAIN: ICD-10-CM

## 2024-12-28 DIAGNOSIS — M54.42 CHRONIC LOW BACK PAIN WITH BILATERAL SCIATICA, UNSPECIFIED BACK PAIN LATERALITY: Primary | ICD-10-CM

## 2024-12-28 DIAGNOSIS — G89.29 CHRONIC LOW BACK PAIN WITH BILATERAL SCIATICA, UNSPECIFIED BACK PAIN LATERALITY: Primary | ICD-10-CM

## 2024-12-28 DIAGNOSIS — M54.41 CHRONIC LOW BACK PAIN WITH BILATERAL SCIATICA, UNSPECIFIED BACK PAIN LATERALITY: Primary | ICD-10-CM

## 2024-12-28 PROCEDURE — 99283 EMERGENCY DEPT VISIT LOW MDM: CPT | Performed by: STUDENT IN AN ORGANIZED HEALTH CARE EDUCATION/TRAINING PROGRAM

## 2024-12-28 PROCEDURE — 2500000001 HC RX 250 WO HCPCS SELF ADMINISTERED DRUGS (ALT 637 FOR MEDICARE OP): Performed by: STUDENT IN AN ORGANIZED HEALTH CARE EDUCATION/TRAINING PROGRAM

## 2024-12-28 RX ORDER — MELOXICAM 7.5 MG/1
7.5 TABLET ORAL DAILY
Status: DISCONTINUED | OUTPATIENT
Start: 2024-12-28 | End: 2024-12-28 | Stop reason: HOSPADM

## 2024-12-28 RX ORDER — MELOXICAM 7.5 MG/1
7.5 TABLET ORAL DAILY
Qty: 30 TABLET | Refills: 0 | Status: SHIPPED | OUTPATIENT
Start: 2024-12-28 | End: 2025-01-27

## 2024-12-28 RX ORDER — GABAPENTIN 100 MG/1
CAPSULE ORAL
Qty: 63 CAPSULE | Refills: 0 | Status: SHIPPED | OUTPATIENT
Start: 2024-12-28 | End: 2025-01-25

## 2024-12-28 RX ORDER — ACETAMINOPHEN 325 MG/1
975 TABLET ORAL ONCE
Status: COMPLETED | OUTPATIENT
Start: 2024-12-28 | End: 2024-12-28

## 2024-12-28 RX ADMIN — ACETAMINOPHEN 975 MG: 325 TABLET ORAL at 17:28

## 2024-12-28 RX ADMIN — MELOXICAM 7.5 MG: 7.5 TABLET ORAL at 17:28

## 2024-12-28 ASSESSMENT — COLUMBIA-SUICIDE SEVERITY RATING SCALE - C-SSRS
1. IN THE PAST MONTH, HAVE YOU WISHED YOU WERE DEAD OR WISHED YOU COULD GO TO SLEEP AND NOT WAKE UP?: NO
6. HAVE YOU EVER DONE ANYTHING, STARTED TO DO ANYTHING, OR PREPARED TO DO ANYTHING TO END YOUR LIFE?: NO
2. HAVE YOU ACTUALLY HAD ANY THOUGHTS OF KILLING YOURSELF?: NO

## 2024-12-28 ASSESSMENT — PAIN - FUNCTIONAL ASSESSMENT: PAIN_FUNCTIONAL_ASSESSMENT: 0-10

## 2024-12-28 ASSESSMENT — PAIN SCALES - GENERAL: PAINLEVEL_OUTOF10: 4

## 2024-12-28 NOTE — ED PROVIDER NOTES
HPI   Chief Complaint   Patient presents with    Fall    Back Pain    left knee pain       HPI: Patient is a 79-year-old male with a history of vitamin D deficiency, prediabetes, polymyalgia rheumatica, prostate cancer with osseous mets who is presenting to the emergency department for 2 concerns.  The first is worsening sciatica.  He describes it is going down both legs, worse on the left side, has been for quite some time however is progressively getting worse.  States that the pain gets intense to where he feels like he needs to drop to his knees.  He did fall onto his left knee today but did not hit his head or lose consciousness.  No bowel or bladder incontinence.  He reports he takes Advil as well as a therapeutic cream at home which sometimes improves the symptoms however has not noticed much of an improvement over the past day or 2.  The patient also reports pain in his legs bilaterally at night.  Says it feels like somebody lit a match under his legs and the only relief is when he stops the blanket or sheet from touching his legs.  Says it is better during the day.  However today it was occurring during the day and so he came into the ER for further evaluation.  He denies any rashes or lesions, denies any bruising or abrasions.      ROS: Complete 12 point review of systems performed, otherwise negative except as noted in the history of present illness    PMH: Reviewed, documented below in note. Pertinents in HPI  PSH: Reviewed and documented below in note. Pertinents in HPI  SH: Lives at home with his wife.  No illicits.  Not homeless.  Fam: Reviewed, noncontributory to patients current complaint  MEDS: Reviewed and documented below in note. Pertinents in HPI  ALLERGIES: Reviewed and documented below in note.        History provided by:  Patient, spouse and medical records   used: No                          [place] Coma Scale Score: 15                  Patient History   History  "reviewed. No pertinent past medical history.  Past Surgical History:   Procedure Laterality Date    CT GUIDED PERCUTANEOUS BIOPSY LUNG  4/17/2023    CT GUIDED PERCUTANEOUS BIOPSY LUNG POR CT    OTHER SURGICAL HISTORY  08/05/2019    Ostectomy of calcaneus for spur    OTHER SURGICAL HISTORY  08/05/2019    Tonsillectomy    OTHER SURGICAL HISTORY  08/05/2019    Appendectomy    OTHER SURGICAL HISTORY  08/05/2019    Eye surgery    OTHER SURGICAL HISTORY  08/05/2019    Knee surgery    OTHER SURGICAL HISTORY  03/08/2022    Cataract surgery     Family History   Problem Relation Name Age of Onset    No Known Problems Mother      No Known Problems Father      Diabetes Sister      Coronary artery disease Sister       Social History     Tobacco Use    Smoking status: Never    Smokeless tobacco: Never   Substance Use Topics    Alcohol use: Never    Drug use: Never       Physical Exam   Visit Vitals  /75   Pulse 84   Temp 36.7 °C (98.1 °F)   Resp 20   Ht 1.753 m (5' 9\")   Wt 75.8 kg (167 lb)   SpO2 98%   BMI 24.66 kg/m²   Smoking Status Never   BSA 1.92 m²      Physical Exam  Vitals and nursing note reviewed.   Constitutional:       Appearance: Normal appearance.   HENT:      Head: Normocephalic and atraumatic.   Neck:      Vascular: No carotid bruit.   Cardiovascular:      Rate and Rhythm: Normal rate and regular rhythm.      Pulses: Normal pulses.      Heart sounds: Normal heart sounds.   Pulmonary:      Effort: Pulmonary effort is normal.      Breath sounds: Normal breath sounds.   Abdominal:      General: There is no distension.      Palpations: Abdomen is soft.      Tenderness: There is no abdominal tenderness. There is no guarding or rebound.   Musculoskeletal:         General: No tenderness, deformity or signs of injury.      Cervical back: Normal range of motion. No rigidity.      Comments: Examination of the bilateral lower extremities reveals no swelling, no edema, no lacerations or abrasions.  He has full " extension and flexion of the knees bilaterally.  Examination of the spine reveals some lower lumbar tenderness, primarily on the left side, no step-offs, no deformities.   Skin:     General: Skin is warm and dry.      Capillary Refill: Capillary refill takes less than 2 seconds.   Neurological:      General: No focal deficit present.      Mental Status: He is alert and oriented to person, place, and time.      Sensory: No sensory deficit.      Motor: No weakness.   Psychiatric:         Mood and Affect: Mood normal.         Behavior: Behavior normal.         No orders to display       Labs Reviewed - No data to display      ED Course & MDM   Diagnoses as of 12/28/24 1715   Chronic low back pain with bilateral sciatica, unspecified back pain laterality   Neuropathic pain           Medical Decision Making  All mentioned lab results, ECGs, and imaging were independently reviewed by myself  - Patient evaluated.  Patient is presenting to the emergency department for 2 different symptoms including back pain which she believes is sciatica as well as lower extremity pain worse at night.  The lower extremity pain appears to be a likely neuropathic pain in nature.  He was previously on gabapentin, 300 mg 3 times a day, however noted that it was making him feel very groggy and confused and so they stopped it.  He did report that while he was on the gabapentin however the pain in his legs had resolved.  Because of this I feel that going back on the gabapentin at a lower dose and progressing up to 300 mg only at night when he is having his symptoms may be of benefit to improving this while decreasing the other side effects as he will not be taking it around-the-clock.  In terms of the sciatica, I feel that this is an acute exacerbation of his symptoms and this can be managed with increasing his pain control at home not only with the gabapentin at night but also changing from Advil to meloxicam as well as initiating Tylenol as  well for breakthrough pain in addition to the therapeutic cream.  At this time he has no additional neurological findings or new traumas that I would feel warrant a CT scan or MRI although it was considered.  Patient will follow-up with the [facility] as well as his primary care physician.  He was discharged otherwise stable condition.    - Monitored for any changes in stability or symptomatology. Patient remained stable.   - Counseled regarding labs, imaging, diagnosis, and plan. Patient was agreeable. All questions were answered. The patient was receptive and agreeable to the plan of care.   -The patient was instructed to return to the emergency department if any symptoms recurred, worsened, or if there were any additional concerns.    *Disclaimer: This note was dictated by speech recognition. Minor errors in transcription may be present. Please call with questions.    Fidencio Tatum MD             Your medication list        START taking these medications        Instructions Last Dose Given Next Dose Due   gabapentin 100 mg capsule  Commonly known as: Neurontin  Start taking on: December 28, 2024      Take 1 capsule (100 mg) by mouth once daily at bedtime for 7 days, THEN 2 capsules (200 mg) once daily at bedtime for 7 days, THEN 3 capsules (300 mg) once daily at bedtime for 14 days.       meloxicam 7.5 mg tablet  Commonly known as: Mobic      Take 1 tablet (7.5 mg) by mouth once daily.              ASK your doctor about these medications        Instructions Last Dose Given Next Dose Due   abiraterone 500 mg tablet  Commonly known as: Zytiga      Take 2 tablets (1,000 mg total) by mouth once daily.  Swallow whole. Do not eat 2 hours before or 1 hour after.       cholecalciferol (vitamin D3) 250 mcg (10,000 unit) capsule           clobetasol 0.05 % cream  Commonly known as: Temovate           clobetasol 0.05 % external solution  Commonly known as: Temovate           Fish OiL 340-1,000 mg  capsule  Generic drug: omega-3 fatty acids-fish oil           ketoconazole 2 % shampoo  Commonly known as: NIZOral           latanoprost 0.005 % ophthalmic solution  Commonly known as: Xalatan           leuprolide (1-month) 3.75 mg injection  Commonly known as: Lupron Depot           predniSONE 5 mg tablet  Commonly known as: Deltasone      Take 1 tablet (5 mg) by mouth once daily.       tafluprost (PF) 0.0015 % dropperette           triamcinolone 0.1 % cream  Commonly known as: Kenalog           Vitamins B Complex tablet  Generic drug: vitamin B complex                     Where to Get Your Medications        These medications were sent to [id number] [place] [address] [address] [address]      Phone: [phone]   gabapentin 100 mg capsule  meloxicam 7.5 mg tablet         Procedure  Procedures     *This report was transcribed using voice recognition software.  Every effort was made to ensure accuracy; however, inadvertent computerized transcription errors may be present.*  Ac Tatum MD  12/28/24         Ac Tatum MD  12/28/24 0832

## 2024-12-30 ENCOUNTER — TELEPHONE (OUTPATIENT)
Dept: PRIMARY CARE | Facility: CLINIC | Age: 79
End: 2024-12-30
Payer: MEDICARE

## 2024-12-30 NOTE — TELEPHONE ENCOUNTER
Shalom needs an appointment 6 months from his last visit (12/9/24) can you please call him and schedule?

## 2025-01-06 ENCOUNTER — SPECIALTY PHARMACY (OUTPATIENT)
Dept: PHARMACY | Facility: CLINIC | Age: 80
End: 2025-01-06

## 2025-01-06 DIAGNOSIS — C34.91 NON-SMALL CELL CANCER OF RIGHT LUNG (MULTI): ICD-10-CM

## 2025-01-06 DIAGNOSIS — C61 PROSTATE CANCER (MULTI): ICD-10-CM

## 2025-01-06 RX ORDER — ABIRATERONE 500 MG/1
1000 TABLET ORAL DAILY
Qty: 60 TABLET | Refills: 3 | Status: SHIPPED | OUTPATIENT
Start: 2025-01-06

## 2025-01-17 DIAGNOSIS — C61 PROSTATE CANCER (MULTI): ICD-10-CM

## 2025-01-20 ENCOUNTER — INFUSION (OUTPATIENT)
Dept: HEMATOLOGY/ONCOLOGY | Facility: CLINIC | Age: 80
End: 2025-01-20
Payer: MEDICARE

## 2025-01-20 ENCOUNTER — OFFICE VISIT (OUTPATIENT)
Dept: HEMATOLOGY/ONCOLOGY | Facility: CLINIC | Age: 80
End: 2025-01-20
Payer: MEDICARE

## 2025-01-20 VITALS
HEIGHT: 69 IN | DIASTOLIC BLOOD PRESSURE: 79 MMHG | WEIGHT: 169.09 LBS | OXYGEN SATURATION: 100 % | TEMPERATURE: 97.2 F | HEART RATE: 78 BPM | SYSTOLIC BLOOD PRESSURE: 149 MMHG | BODY MASS INDEX: 25.04 KG/M2 | RESPIRATION RATE: 16 BRPM

## 2025-01-20 VITALS
DIASTOLIC BLOOD PRESSURE: 79 MMHG | SYSTOLIC BLOOD PRESSURE: 149 MMHG | TEMPERATURE: 97.2 F | RESPIRATION RATE: 16 BRPM | HEART RATE: 78 BPM | OXYGEN SATURATION: 100 %

## 2025-01-20 DIAGNOSIS — C34.91 NON-SMALL CELL CANCER OF RIGHT LUNG (MULTI): ICD-10-CM

## 2025-01-20 DIAGNOSIS — G62.9 PERIPHERAL POLYNEUROPATHY: ICD-10-CM

## 2025-01-20 DIAGNOSIS — C61 PROSTATE CANCER (MULTI): ICD-10-CM

## 2025-01-20 DIAGNOSIS — R73.03 PREDIABETES: ICD-10-CM

## 2025-01-20 LAB
ALBUMIN SERPL BCP-MCNC: 4 G/DL (ref 3.4–5)
ALP SERPL-CCNC: 173 U/L (ref 33–136)
ALT SERPL W P-5'-P-CCNC: 11 U/L (ref 10–52)
ANION GAP SERPL CALC-SCNC: 11 MMOL/L (ref 10–20)
AST SERPL W P-5'-P-CCNC: 18 U/L (ref 9–39)
BASOPHILS # BLD AUTO: 0.01 X10*3/UL (ref 0–0.1)
BASOPHILS NFR BLD AUTO: 0.2 %
BILIRUB SERPL-MCNC: 0.3 MG/DL (ref 0–1.2)
BUN SERPL-MCNC: 16 MG/DL (ref 6–23)
CALCIUM SERPL-MCNC: 9.1 MG/DL (ref 8.6–10.3)
CHLORIDE SERPL-SCNC: 102 MMOL/L (ref 98–107)
CO2 SERPL-SCNC: 29 MMOL/L (ref 21–32)
CREAT SERPL-MCNC: 0.72 MG/DL (ref 0.5–1.3)
EGFRCR SERPLBLD CKD-EPI 2021: >90 ML/MIN/1.73M*2
EOSINOPHIL # BLD AUTO: 0.04 X10*3/UL (ref 0–0.4)
EOSINOPHIL NFR BLD AUTO: 0.8 %
ERYTHROCYTE [DISTWIDTH] IN BLOOD BY AUTOMATED COUNT: 14.2 % (ref 11.5–14.5)
GLUCOSE SERPL-MCNC: 113 MG/DL (ref 74–99)
HCT VFR BLD AUTO: 31.5 % (ref 41–52)
HGB BLD-MCNC: 9.8 G/DL (ref 13.5–17.5)
IMM GRANULOCYTES # BLD AUTO: 0.01 X10*3/UL (ref 0–0.5)
IMM GRANULOCYTES NFR BLD AUTO: 0.2 % (ref 0–0.9)
LYMPHOCYTES # BLD AUTO: 0.58 X10*3/UL (ref 0.8–3)
LYMPHOCYTES NFR BLD AUTO: 11.6 %
MCH RBC QN AUTO: 29.2 PG (ref 26–34)
MCHC RBC AUTO-ENTMCNC: 31.1 G/DL (ref 32–36)
MCV RBC AUTO: 94 FL (ref 80–100)
MONOCYTES # BLD AUTO: 0.38 X10*3/UL (ref 0.05–0.8)
MONOCYTES NFR BLD AUTO: 7.6 %
NEUTROPHILS # BLD AUTO: 3.97 X10*3/UL (ref 1.6–5.5)
NEUTROPHILS NFR BLD AUTO: 79.6 %
PLATELET # BLD AUTO: 224 X10*3/UL (ref 150–450)
POTASSIUM SERPL-SCNC: 3.6 MMOL/L (ref 3.5–5.3)
PROT SERPL-MCNC: 6.6 G/DL (ref 6.4–8.2)
PSA SERPL-MCNC: 618 NG/ML
RBC # BLD AUTO: 3.36 X10*6/UL (ref 4.5–5.9)
SODIUM SERPL-SCNC: 138 MMOL/L (ref 136–145)
WBC # BLD AUTO: 5 X10*3/UL (ref 4.4–11.3)

## 2025-01-20 PROCEDURE — 96402 CHEMO HORMON ANTINEOPL SQ/IM: CPT

## 2025-01-20 PROCEDURE — 1126F AMNT PAIN NOTED NONE PRSNT: CPT | Performed by: INTERNAL MEDICINE

## 2025-01-20 PROCEDURE — 1159F MED LIST DOCD IN RCRD: CPT | Performed by: INTERNAL MEDICINE

## 2025-01-20 PROCEDURE — 36415 COLL VENOUS BLD VENIPUNCTURE: CPT | Performed by: INTERNAL MEDICINE

## 2025-01-20 PROCEDURE — 83921 ORGANIC ACID SINGLE QUANT: CPT | Performed by: INTERNAL MEDICINE

## 2025-01-20 PROCEDURE — 85025 COMPLETE CBC W/AUTO DIFF WBC: CPT | Performed by: INTERNAL MEDICINE

## 2025-01-20 PROCEDURE — 86334 IMMUNOFIX E-PHORESIS SERUM: CPT | Mod: PORLAB | Performed by: INTERNAL MEDICINE

## 2025-01-20 PROCEDURE — 80053 COMPREHEN METABOLIC PANEL: CPT | Performed by: INTERNAL MEDICINE

## 2025-01-20 PROCEDURE — 99214 OFFICE O/P EST MOD 30 MIN: CPT | Performed by: INTERNAL MEDICINE

## 2025-01-20 PROCEDURE — 1123F ACP DISCUSS/DSCN MKR DOCD: CPT | Performed by: INTERNAL MEDICINE

## 2025-01-20 PROCEDURE — 83036 HEMOGLOBIN GLYCOSYLATED A1C: CPT | Mod: PORLAB | Performed by: INTERNAL MEDICINE

## 2025-01-20 PROCEDURE — 84153 ASSAY OF PSA TOTAL: CPT | Performed by: INTERNAL MEDICINE

## 2025-01-20 PROCEDURE — 82607 VITAMIN B-12: CPT | Performed by: INTERNAL MEDICINE

## 2025-01-20 PROCEDURE — 1160F RVW MEDS BY RX/DR IN RCRD: CPT | Performed by: INTERNAL MEDICINE

## 2025-01-20 PROCEDURE — 2500000004 HC RX 250 GENERAL PHARMACY W/ HCPCS (ALT 636 FOR OP/ED): Mod: JZ,JG,TB | Performed by: INTERNAL MEDICINE

## 2025-01-20 RX ORDER — FAMOTIDINE 10 MG/ML
20 INJECTION INTRAVENOUS ONCE AS NEEDED
OUTPATIENT
Start: 2025-04-14

## 2025-01-20 RX ORDER — EPINEPHRINE 0.3 MG/.3ML
0.3 INJECTION SUBCUTANEOUS EVERY 5 MIN PRN
OUTPATIENT
Start: 2025-04-14

## 2025-01-20 RX ORDER — ALBUTEROL SULFATE 0.83 MG/ML
3 SOLUTION RESPIRATORY (INHALATION) AS NEEDED
OUTPATIENT
Start: 2025-04-14

## 2025-01-20 RX ORDER — DIPHENHYDRAMINE HYDROCHLORIDE 50 MG/ML
50 INJECTION INTRAMUSCULAR; INTRAVENOUS AS NEEDED
OUTPATIENT
Start: 2025-04-14

## 2025-01-20 RX ADMIN — LEUPROLIDE ACETATE 22.5 MG: KIT at 13:33

## 2025-01-20 ASSESSMENT — PAIN SCALES - GENERAL: PAINLEVEL_OUTOF10: 0-NO PAIN

## 2025-01-20 NOTE — PATIENT INSTRUCTIONS
Follow up visit for history of metastatic prostate cancer.     Continue lupron every 3 months and zytiga.     PSMA scan to be completed. Follow up with Dr. Yan after scan to discuss results and plan.

## 2025-01-20 NOTE — SIGNIFICANT EVENT
01/20/25 1417   Prechemo Checklist   Has the patient been in the hospital, ED, or urgent care since last date of service No   Chemo/Immuno Consent Completed and Signed Not applicable   Protocol/Indications Verified Yes   Confirmed to previous date/time of medication Yes   Compared to previous dose Yes   All medications are dated accurately Yes   Pregnancy Test Negative Not applicable   Parameters Met Yes   BSA/Weight-Height Verified Yes   Dose Calculations Verified (current, total, cumulative) Yes

## 2025-01-20 NOTE — PROGRESS NOTES
"Date of Service: 1/22/2025  Patient: Shalom Horton  MRN: [id number]  Referring Provider: No ref. provider found  PCP: Gian Waller DO    History of Present Illness:   Mr. Horton is a 79 y.o. male who presents to neurology clinic for evaluation of pain in feet. Shalom Horton's past medical history is pertinent for adenocarcinoma of the lung, metastatic prostate cancer with bony mets, glaucoma.  He is accompanied by his wife who provides some of the history.  History of symptoms is somewhat difficult as the patient is somewhat a poor historian.    He has a history of chronic lower extremity pain, described as burning/tingling in his feet and calves, but this has improved to some extent recently. He will also get rarely get radicular pain down the right leg, which he attributes to \"sciatica,\" but when this \"flares up\" he can have difficulty walking until it calms down.  He also has \"bad knees\" which affects his walking at times. He has ambulated with a cane since late 2022 due to imbalance. No recent falls. He also has tingling in his fingers as well. He has a history of prediabetes.     He is taking gabapentin 200mg at bedtime. He is unsure if this has helped with the pain. He reports at higher doses he had brain fog, which improved at lower doses.  He is interested in coming off of the gabapentin at this time.    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 Sister has PD and brother with action tremors.    Review of Systems:  The systems were reviewed with pertinent positives and negatives documented in the HPI.      Past Medical & Surgical History  No past medical history on file.  Past Surgical History:   Procedure Laterality Date    CT GUIDED PERCUTANEOUS BIOPSY LUNG  4/17/2023    CT GUIDED PERCUTANEOUS BIOPSY LUNG POR CT    OTHER SURGICAL HISTORY  08/05/2019    Ostectomy of calcaneus for spur    OTHER SURGICAL HISTORY  08/05/2019    Tonsillectomy    OTHER SURGICAL HISTORY  08/05/2019    " Appendectomy    OTHER SURGICAL HISTORY  08/05/2019    Eye surgery    OTHER SURGICAL HISTORY  08/05/2019    Knee surgery    OTHER SURGICAL HISTORY  03/08/2022    Cataract surgery     Social History:   Social History     Tobacco Use    Smoking status: Never    Smokeless tobacco: Never   Substance Use Topics    Alcohol use: Never     Family History:   Sister has PD and brother with action tremors.     Medications:     Current Outpatient Medications:     abiraterone (Zytiga) 500 mg tablet, Take 2 tablets (1,000 mg total) by mouth once daily.  Swallow whole. Do not eat 2 hours before or 1 hour after., Disp: 60 tablet, Rfl: 3    cholecalciferol, vitamin D3, 250 mcg (10,000 unit) capsule, Take by mouth., Disp: , Rfl:     clobetasol (Temovate) 0.05 % cream, APPLY PEA SIZE AMOUNT TO AFFECTED AREAS OF THE BODY TWICE DAILY FOR 14 DAYS AT A TIME. AVOID THE FACE AND GROIN., Disp: , Rfl:     clobetasol (Temovate) 0.05 % external solution, APPLY A FEW DROPS TO SCALP ONCE DAILY FOR UP TO 2 WEEKS AT A TIME AS NEEDED, Disp: , Rfl:     gabapentin (Neurontin) 100 mg capsule, Take 1 capsule (100 mg) by mouth once daily at bedtime for 7 days, THEN 2 capsules (200 mg) once daily at bedtime for 7 days, THEN 3 capsules (300 mg) once daily at bedtime for 14 days., Disp: 63 capsule, Rfl: 0    ketoconazole (NIZOral) 2 % shampoo, , Disp: , Rfl:     latanoprost (Xalatan) 0.005 % ophthalmic solution, INSTILL ONE DROP INTO LEFT EYE EVERY NIGHT AT BEDTIME, Disp: , Rfl:     leuprolide, 1-month, (Lupron Depot) 3.75 mg injection, Inject 3.75 mg into the muscle 1 time., Disp: , Rfl:     meloxicam (Mobic) 7.5 mg tablet, Take 1 tablet (7.5 mg) by mouth once daily., Disp: 30 tablet, Rfl: 0    omega-3 fatty acids-fish oil (Fish OiL) 340-1,000 mg capsule, Take by mouth., Disp: , Rfl:     predniSONE (Deltasone) 5 mg tablet, Take 1 tablet (5 mg) by mouth once daily., Disp: 30 tablet, Rfl: 3    tafluprost, PF, 0.0015 % dropperette, Administer into affected  eye(s)., Disp: , Rfl:     triamcinolone (Kenalog) 0.1 % cream, APPLY A PEA SIZED AMOUNT TO AFFECTED AREAS OF THE BACK AND LEGS TWICE DAILY FOR 14 DAYS. AVOID FACE, Disp: , Rfl:     vitamin B complex (Vitamins B Complex) tablet, Take 1 tablet by mouth once daily., Disp: , Rfl:      General Physical Exam:  There were no vitals taken for this visit.     He looks well and is not in any acute distress. Breathing comfortably on room air.     Neurological Exam:   Mental status reveals him to be alert and oriented. Speech is intact to conversation. Fund of knowledge is normal.     Cranial nerves:  CN 2   Visual fields full to confrontation.   CN 3, 4, 6   Pupils round, 4 mm in diameter, equally reactive to light. Lids symmetric; no ptosis. EOMs normal alignment, full range.   No nystagmus.   CN 5   Facial sensation intact bilaterally.   CN 7   Normal and symmetric facial strength. Nasolabial folds symmetric.   CN 8   Hard of hearing  CN 9   Palate elevates symmetrically.   CN 11   Normal strength of shoulder shrug  CN 12   Tongue midline, with normal bulk and strength     Motor:  Muscle bulk: Normal throughout.  Muscle tone: Normal in both upper and lower extremities.  Movements: Mild bilateral action tremor    R L   5 5 Shoulder abduction  5 5 Elbow flexion  5 5 Elbow extension  5 5 Finger flexion  5 5 Finger extension  5 5 Finger abduction  4+4+ Hip flexion  5 5 Hip extension  5 5 Hip abduction (with hip flexed)  5 5 Knee flexion  5 5 Knee extension  5 5- Ankle dorsiflexion  5 5 Ankle plantarflexion  5 5 Ankle inversion  5 5 Ankle eversion  5 5 Big toe extension  5 5 Toe flexion     Reflexes                         R     L  Triceps          2      2  Biceps           2      2  Brachiorad    2      2  Patellar         2      2   Achilles         2      2    Babinski: toes downgoing to plantar stimulation. No clonus or other pathologic reflexes present.      Sensory:   Light Touch: Diminished in distal lower extremities to  mid shin  Pin: Diminished in distal lower extremities to mid shin   Position: Mildly impaired at great toes bilaterally  Vibration: Impaired up to ankles bilaterally in the lower extremities  Temperature: Normal     Coordination:  In both upper extremities, finger-nose-finger was intact without dysmetria or overshoot.   In both lower extremities, heel-to-shin was intact. SARA were intact in both upper and lower extremities.      Gait:  Mildly wide base.  Ambulates with cane.  Gait is unsteady with a mild shuffle.    Results:    Lab Results   Component Value Date    HGBA1C 5.8 12/09/2024     Lab Results   Component Value Date    AATXUXVB56 671 03/08/2022     IRON STUDIES:   Lab Results   Component Value Date    IRON 61 03/01/2023    TIBC 223 (L) 03/01/2023    FERRITIN 1,237 (H) 03/01/2023     Lab Results   Component Value Date    KASEY NEGATIVE 01/05/2023     Lab Results   Component Value Date    CKTOTAL 68 01/05/2023     Lab Results   Component Value Date    SPEP ABNORMAL 03/01/2023     CBC:   Lab Results   Component Value Date    WBC 3.8 (L) 08/09/2024    HGB 12.1 (L) 08/09/2024    HCT 36.3 (L) 08/09/2024     08/09/2024     BMP:   Lab Results   Component Value Date     08/09/2024    K 4.2 08/09/2024     08/09/2024    CO2 27 08/09/2024    BUN 15 08/09/2024    CREATININE 0.72 08/09/2024    CALCIUM 9.0 08/09/2024    MG CANCELED 03/06/2023    PHOS 3.8 03/02/2023     LFT:   Lab Results   Component Value Date    ALKPHOS 255 (H) 08/09/2024    BILITOT 0.6 08/09/2024    PROT 6.0 (L) 08/09/2024    ALBUMIN 4.2 08/09/2024    ALT 14 08/09/2024    AST 22 08/09/2024     Imaging:  MRI lumbar spine (2023)    1. Extensive osseous marrow replacement throughout the lumbosacral   spine, posterior elements and bilateral iliac crests, with areas of   patchy heterogenous enhancement compatible with osseous metastatic   disease. No evidence of a pathologic fracture.   2. No suspicious cord signal abnormality, parenchymal or  "  leptomeningeal enhancement.   3. Multilevel degenerative changes of the lumbar spine with up to   mild canal stenosis and neural foraminal narrowing, most prominent at   L3-L4 and L5-S1.     Impression:  Shalom Horton is a 79 y.o. who presents with burning/tingling in his feet bilaterally.  Neurological exam shows impaired light touch, pinprick and vibration in a length dependent manner.  He also has mild hip flexion weakness bilaterally.  MRI lumbar spine with and without contrast (2023, was personally reviewed.  There is no evidence of cord signal abnormality or leptomeningeal enhancement of the nerve roots.  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      He does have a history of prostate cancer and is currently on leuprolide and abiraterone.  There are some reports of peripheral neuropathy from leuprolide, but it is not a typical side effect. He reports a history of prediabetes, but no recent hemoglobin A1c has been checked.  Blood work is ordered to evaluate for other potential etiologies.      He is currently on gabapentin for neuropathic pain, but wants to come off of it as he feels it causes some brain fog.  We discussed alternative options including duloxetine, but he is not interested in a daily oral medication at this time.  Instead, I have recommended transdermal topical cream which can be applied to the feet as needed.  He did find the gabapentin helpful when he was having radicular pain in the right lower extremity and he can restart if this pain flares up.    He also reports impaired balance which the peripheral neuropathy may be contributing to.  He also reports \"bad\" knees and is following up with orthopedics for further evaluation.  I recommend physical therapy.    Plan:  Peripheral polyneuropathy   -     Hemoglobin A1C; Future  -     Vitamin B12; Future  -     Methylmalonic Acid; Future  -     Serum Protein Electrophoresis + Immunofixation; Future  -     " Tat Momoli/Lambda Free Light Chain, Serum; Future  -     Vitamin B6; Future  -     gabapentin (Neurontin) 100 mg capsule; Take 2 capsules (200 mg) by mouth once daily at bedtime.  -     Transdermal cream prescription sent.  Patient provided with information.    Abnormality of gait due to impairment of balance  Right lower extremity radicular pain  -     Referral to Physical Therapy; Future    I will call the patient with the results of his blood test.     Reviewed and approved by ROZ LOWE on 1/20/25 at 10:57 AM.    I personally spent 68 minutes on the day of the visit completing the review of the medical record and outside records, obtaining history and performing an appropriate physical exam, patient care, counseling and education, placing orders, independently reviewing results, communicating with the patient/family and other providers, coordinating care and performing appropriate clinical documentation.

## 2025-01-20 NOTE — PROGRESS NOTES
Pt arrived awake, alert, oriented, no apparent distress with unlabored breaths. Pt reports feeling well today without s/sx of illness. Pt here for his Q 3 month leuprolide acetate, in which he received and tolerated well. Pt with next appointment set for 4/14/25, no further questions or concerns, discharged in stable condition

## 2025-01-20 NOTE — PROGRESS NOTES
Patient Visit Information:   Visit Type: Follow Up Visit      Cancer History:   Treatment Synopsis:    1.  Metastatic prostate cancer, diagnosed after right humerus bone biopsy     3/15/23 , PSA 7899     Current treatment, Lupron injection every 3 months, Casodex 50 mg p.o. daily , 3/23   5/2/23 , PSA 36.4  9/13/23 , PSA 18.67  12/6/2023 PSA 31.07 2.    2/23/24 , PSA 60.99  6/15/24 , .0, Casodex stopped, Zytiga started  8/15/24 , .0  10/28/24 ,     2. Adenocarcinoma of right lung, PD-L1 70%, stage I     Right lower lobe lung mass measuring 3.9 cm with hilar and mediastinal lymphadenopathy and multiple pulmonary nodules     4/17/23 , LUNG MASS, BIOPSY:  -- ADENOCARCINOMA WITH MUCINOUS FEATURES, INVOLVING LUNG PARENCHYMA;  Status post EBUS, lymph node negative for metastatic disease    SBRT offered, patient refused    Teller   Patient is 77-year-old gentleman with history of hypertension, generalized weakness chronic lower back pain and lower extremity pain and patient was admitted in the hospital because of mechanical fall and diagnosed with right humerus fracture status post  IMN on March 1, 2020.      Biopsy of right humerus is positive for metastatic prostate cancer.      X-ray of the hip also did show multiple sclerotic bony lesion consistent with metastatic disease      March 1, 2023 CAT scan of the chest abdominal pelvis done with did show hilar lymphadenopathy, mediastinal lymphadenopathy, right lower lobe mass measuring 3.9 cm multiple pulmonary nodules and hypodense hepatic lesion measuring 5 mm and multiple sclerotic  lytic bony lesion.  MRI brain negative   Patient has diagnosis of metastatic prostate cancer on the basis of right humerus fracture and CAT scan of the chest did show right lower lobe mass measuring 3.9 cm with multiple pulmonary nodules it is quite possible we are dealing with metastatic disease  versus concurrent   lung primary also               Chief Complaint:  Metastatic prostate cancer, possible  metastatic lung cancer   Interval History:    1/20/25    Patient has a history of metastatic prostate cancer receiving Lupron and Casodex.  PSA increased to 310, Casodex was stopped Zytiga was started in June 2024.  Patient is complaining more weakness fatigue and increased urinary frequency, no hematuria, no any bony pain.  PSA is increasing, in October 2025 PSA was 618 and today PSA is pending.              Patient is 77-year-old gentleman with history of hypertension, generalized weakness chronic lower back pain and lower extremity pain and patient was admitted in the  hospital because of mechanical fall and diagnosed with right humerus fracture status post IMN on March 1, 2020.      Biopsy of right humerus is positive for metastatic prostate cancer.      X-ray of the hip also did show multiple sclerotic bony lesion consistent with metastatic disease      March 1, 2023 CAT scan of the chest abdominal pelvis done with did show hilar lymphadenopathy, mediastinal lymphadenopathy, right lower lobe mass measuring 3.9 cm multiple pulmonary nodules and hypodense hepatic lesion measuring 5 mm multiple sclerotic   lytic bony lesion.        Patient has diagnosis of metastatic prostate cancer on the basis of right humerus fracture and CAT scan of the chest did show right lower lobe mass measuring 3.9 cm with multiple pulmonary nodules it is quite possible we are dealing with metastatic disease  versus concurrent   lung primary also           Medical oncology consultation requested for metastatic disease.        Complaining of generalized weakness and fatigue, lower back pain, lower extremity pain more prominent on the right side, no headache or dizziness, no chest pain, no shortness of breath, no hemoptysis, no weight loss, no nausea vomiting, no abdominal pain.   Right humerus pain is better         Review of Systems:   Review of Systems:     No headache and dizziness      No fever       No chest pain or shortness of breath      Right humerus pain is better      No nausea vomiting      No abdominal      No any other bony pain      No dysuria and hematuria        Allergies and Intolerances:       Allergies:         beta blockers: Drug Category, Unknown, Active     Outpatient Medication Profile:  * Patient Currently Takes Medications as of 13-Sep-2023 12:07 documented in Structured Notes         Casodex 50 mg oral tablet : Last Dose Taken:  , 1 tab(s) orally once a day , Start Date: 06-Jul-2023         acetaminophen 325 mg oral tablet: Last Dose Taken:  , 2 tab(s) orally  every 6 hours, As Needed for pain, Start Date: 05-Mar-2023         cholecalciferol 250 mcg (10,000 intl units) oral capsule: Last Dose Taken:   , 1 cap(s) orally once a day         gabapentin 600 mg oral tablet: Last Dose Taken:  , 1 tab(s) orally 3  times a day         tafluprost 0.0015% ophthalmic solution: Last Dose Taken:  , 1 drop(s)  to each affected eye once a day (in the evening)         leuprolide-norethindrone: Last Dose Taken:  , oral and injectable every  3 months             Medical History:         Primary adenocarcinoma of lower lobe of right lung: ICD-10:  C34.31, Status: Active         Lumbar radiculopathy, chronic: ICD-10: M54.16, Status: Active         Back pain: ICD-10: M54.9, Status: Active         Mass of lower lobe of right lung: ICD-10: R91.8, Status:  Active         Malignant neoplasm of prostate metastatic to bone: ICD-10:  C61, Status: Active         Lung mass: ICD-10: R91.8, Status: Active       Surg History:         S/P eye surgery: ICD-10: Z98.890, Status: Active         S/P tonsillectomy: ICD-10: Z90.89, Status: Active         S/P appendectomy: ICD-10: Z90.49, Status: Active         S/P foot surgery: ICD-10: Z98.890, Status: Active         H/O: knee surgery: ICD-10: Z98.890, Status: Active         History of surgery on arm: ICD-10: Z98.890, Status: Active     Family History: No Family History items  are recorded  in the problem list.       Social History:   Social Substance History:  ·  Smoking Status never smoker (1)   ·  Alcohol Use denies   ·  Drug Use denies            Vitals and Measurements:   Vitals: Temp: 36.9  HR: 66  RR: 16  BP: 166/84  SPO2%:   95   Measurements: HT(cm): 179.8  WT(kg): 87.8  BSA: 2.09   BMI:  27.1   Last 3 Weights & Heights: Date:                           Weight/Scale Type:                    Height:   13-Sep-2023 12:04                87.8  kg                     179.8  cm  15-Ronak-2023 13:48                86.1  kg                     179.8  cm  02-May-2023 11:07                81.4  kg                     179.8  cm      Physical Exam:      Constitutional: awake/alert/oriented x3, no distress,  complaining of back pain and lower extremity pain   Eyes: PERRL, EOMI, clear sclera   Head/Neck: Neck supple,   thyroid without mass or  tenderness, No JVD, trachea midline, no bruits   Respiratory/Thorax: Patent airways, CTAB, normal  breath sounds   Cardiovascular: Regular, rate and rhythm,  normal  S 1and S 2   Gastrointestinal: Nondistended, soft, non-tender,  no rebound tenderness or guarding, no masses palpable, no organomegaly, +BS,   Genitourinary: No CVA tenderness   Musculoskeletal: ROM intact, no joint swelling, no  thoracic spine and lumbar spine tenderness   Extremities: normal extremities, no cyanosis edema,   no clubbing   Neurological: alert and oriented x3, intact senses,  motor, response and reflexes, normal strength   Lymphatic: No significant lymphadenopathy   Psychological: Appropriate mood and behavior   Skin: Warm and dry, no lesions, no rashes         Lab Results:      Status: Final result       Visible to patient: Yes (seen)       Dx: Prostate cancer (Multi)    0 Result Notes             Component  Ref Range & Units 2 mo ago  (10/28/24) 5 mo ago  (8/15/24) 7 mo ago  (6/12/24) 8 mo ago  (5/16/24) 11 mo ago  (2/23/24) 1 yr ago  (12/6/23) 1 yr ago  (9/13/23) 1 yr  ago  (9/13/23)   Prostate Specific AG  <=4.00 ng/mL 618.00 High  265.00 High  310.00 High  247.00 High  60.99 High  31.07 High  18.67 High  R, CM CANCELED R, CM   Resulting Agency [facility]                  1/20/25) 5 mo ago  (8/9/24) 7 mo ago  (6/12/24) 8 mo ago  (5/16/24) 11 mo ago  (2/23/24) 1 yr ago  (12/6/23) 1 yr ago  (9/13/23) 1 yr ago  (9/13/23)    WBC  4.4 - 11.3 x10*3/uL 5.0 3.8 Low  4.8 3.9 Low  3.5 Low  2.8 Low  2.5 Low  R CANCELED R, CM   RBC  4.50 - 5.90 x10*6/uL 3.36 Low  3.83 Low  3.61 Low  3.42 Low  3.69 Low  3.70 Low  3.81 Low  R CANCELED R, CM   Hemoglobin  13.5 - 17.5 g/dL 9.8 Low  12.1 Low  10.9 Low  10.1 Low  11.2 Low  11.5 Low  11.9 Low  CANCELED R, CM   Hematocrit  41.0 - 52.0 % 31.5 Low  36.3 Low  34.7 Low  33.0 Low  35.5 Low  35.3 Low  36.6 Low  CANCELED R, CM   MCV  80 - 100 fL 94 95 96 97 96 95 96 CANCELED R, CM   MCH  26.0 - 34.0 pg 29.2 31.6 30.2 29.5 30.4 31.1     MCHC  32.0 - 36.0 g/dL 31.1 Low  33.3 31.4 Low  30.6 Low  31.5 Low  32.6 32.5 CANCELED R, CM   RDW  11.5 - 14.5 % 14.2 15.3 High  16.8 High  15.1 High  14.6 High  13.9 14.2 CANCELED R, CM   Platelets  150 - 450 x10*3/uL 224                    Assessment and Plan:      Assessment and Plan:   Assessment:    1 metastatic prostate cancer  , Diagnosed after  right humerus bone biopsy     3/15/23 , PSA 7899  Treatment, Lupron injection every 3 months and Casodex 50 mg p.o. daily, 3/23     5/2 23 , PSA 36.4  9/13/23 , PSA 18.67   /23/24 , PSA 16.9  6/15/24 , .0, Casodex stopped, Zytiga started  8/15/24 , .0  10/2024,   2 .  Adenocarcinoma of right lower lobe, PD-L1 71%, stage I disease     Right lower lobe lung mass measuring 3.9 cm with hilar and mediastinal lymphadenopathy and multiple pulmonary nodules  LUNG MASS, BIOPSY:  -- ADENOCARCINOMA WITH MUCINOUS FEATURES, INVOLVING LUNG PARENCHYMA;    Status post EBUS, mediastinal lymph node negative for metastatic disease    Patient was offered SBRT to right  lung mass, patient refused                      plan ,    1/20/25     #1 metastatic prostate cancer, bone scan did show diffuse bony metastatic disease which is confirmed by PET scan and PET scan also did show abnormal metabolic activity in the right lower lobe.       In October 2024 PSA was 618.  Patient is on Lupron injection and Zytiga.  Patient is more anemic today and seems to be patient has progressive disease.    2 .  Adenocarcinoma of right lower lobe, PD-L1 71%     Right lower lobe lung mass measuring 3.9 cm with hilar and mediastinal lymphadenopathy and multiple pulmonary nodules  LUNG MASS, BIOPSY:  -- ADENOCARCINOMA WITH MUCINOUS FEATURES, INVOLVING LUNG PARENCHYMA;  Status post EBUS, linear standing lymph node negative for metastatic disease, SBRT offered,   Will check PSA today    PSMA scan reevaluation after PSMA scan.    Patient has progression disease or treatment option will be to try Lupron and Xtandi or palliative chemotherapy cabazitaxel or Taxotere     Time spent 30 minutes.

## 2025-01-21 ENCOUNTER — SPECIALTY PHARMACY (OUTPATIENT)
Dept: HEMATOLOGY/ONCOLOGY | Facility: CLINIC | Age: 80
End: 2025-01-21
Payer: MEDICARE

## 2025-01-22 ENCOUNTER — APPOINTMENT (OUTPATIENT)
Dept: NEUROLOGY | Facility: CLINIC | Age: 80
End: 2025-01-22
Payer: MEDICARE

## 2025-01-22 VITALS
DIASTOLIC BLOOD PRESSURE: 74 MMHG | BODY MASS INDEX: 24.5 KG/M2 | SYSTOLIC BLOOD PRESSURE: 128 MMHG | WEIGHT: 166 LBS | HEART RATE: 73 BPM

## 2025-01-22 DIAGNOSIS — M79.2 NEUROPATHIC PAIN: ICD-10-CM

## 2025-01-22 DIAGNOSIS — G62.9 PERIPHERAL POLYNEUROPATHY: Primary | ICD-10-CM

## 2025-01-22 DIAGNOSIS — M54.31 SCIATICA OF RIGHT SIDE: ICD-10-CM

## 2025-01-22 DIAGNOSIS — R73.03 PREDIABETES: ICD-10-CM

## 2025-01-22 DIAGNOSIS — R26.89 ABNORMALITY OF GAIT DUE TO IMPAIRMENT OF BALANCE: ICD-10-CM

## 2025-01-22 LAB
PROT SERPL-MCNC: 6.8 G/DL (ref 6.4–8.2)
VIT B12 SERPL-MCNC: 394 PG/ML (ref 211–911)

## 2025-01-22 PROCEDURE — 1123F ACP DISCUSS/DSCN MKR DOCD: CPT | Performed by: STUDENT IN AN ORGANIZED HEALTH CARE EDUCATION/TRAINING PROGRAM

## 2025-01-22 PROCEDURE — 1125F AMNT PAIN NOTED PAIN PRSNT: CPT | Performed by: STUDENT IN AN ORGANIZED HEALTH CARE EDUCATION/TRAINING PROGRAM

## 2025-01-22 PROCEDURE — 1159F MED LIST DOCD IN RCRD: CPT | Performed by: STUDENT IN AN ORGANIZED HEALTH CARE EDUCATION/TRAINING PROGRAM

## 2025-01-22 PROCEDURE — 1160F RVW MEDS BY RX/DR IN RCRD: CPT | Performed by: STUDENT IN AN ORGANIZED HEALTH CARE EDUCATION/TRAINING PROGRAM

## 2025-01-22 PROCEDURE — 1036F TOBACCO NON-USER: CPT | Performed by: STUDENT IN AN ORGANIZED HEALTH CARE EDUCATION/TRAINING PROGRAM

## 2025-01-22 PROCEDURE — 99205 OFFICE O/P NEW HI 60 MIN: CPT | Performed by: STUDENT IN AN ORGANIZED HEALTH CARE EDUCATION/TRAINING PROGRAM

## 2025-01-22 RX ORDER — GABAPENTIN 100 MG/1
200 CAPSULE ORAL NIGHTLY
Qty: 60 CAPSULE | Refills: 1 | Status: SHIPPED | OUTPATIENT
Start: 2025-01-22 | End: 2025-03-23

## 2025-01-22 ASSESSMENT — PAIN SCALES - GENERAL: PAINLEVEL_OUTOF10: 3

## 2025-01-22 NOTE — PATIENT INSTRUCTIONS
It was a pleasure seeing you today.    You were evaluated for neuropathy. I have ordered blood work to look for reversible causes of neuropathy. I have also ordered physical therapy for strengthening and gait training.    I have prescribed transdermal cream    Transdermal pain formulations deliver powerful pain relieving drugs across your skin directly to the site of your pain.  Get your prescription delivered directly to your home! We just need a little help from you:    You will receive a call from [facility] to obtain the information required to fill your prescription. This number will appear as an out of state (409) telephone number on your home phone or an ([phone]) number on your cell phone.  ??If you do not receive a call within 24-48 hours, please call the pharmacy at [phone] and select Option 1 to speak with one of our Patient Advocates.  ??Your insurance typically covers these creams - [employer] may call you first for your approval on co-pay information prior to mailing you a filled prescription.  ??Our Patient Advocates will work to ensure that you are completely satisfied with your customized formulation. They are available to answer any questions you may have in order for you to achieve the best possible outcome from your [employer] topical pain cream. Our goal is to be a partner in your pain management through improving communication between you, your doctor, your insurance company and our pharmacy.    How to get the most out of your pain cream:  To make this pain formulation work really well, it needs to build up a reservoir under your skin. That's why we ask you to rub the cream into your skin for a full two minutes. This will help deliver the maximum amount of drug to the site of your pain. You can watch a short video of this on our website: [email or web address].  ???Wash the affected area beforehand to moisturize the skin and remove any  possible contaminants.  After bathing is a good time to apply the cream.  ???Apply enough of the cream to rub it in well. Typically this would be a nickel sized dollop (1 pump).  Rub in well until absorbed. Wash hands after applying the cream (unless the cream has been prescribed for hand pain).  ???DO NOT bathe or swim for an hour after application.  ???The cream may tingle after application; this is normal.  ???Side effects may include rashes from contamination of the skin surface or allergies to one of the ingredients - please call your doctor or our pharmacy if side effects occur.  ???Please store the cream at normal room temperature and keep out of reach of children and pets.    Refills:  When you have finished your prescription, we will be happy to dispense a refill if it has been authorized by your prescriber.  ??Please call the pharmacy at [phone] and select Option 3  ??For additional information, please visit our website: [email or web address]     If you have any questions or concerns please call my office at [phone].     If you have any questions or concerns please call my office at [phone].

## 2025-01-23 ENCOUNTER — TELEPHONE (OUTPATIENT)
Dept: HEMATOLOGY/ONCOLOGY | Facility: CLINIC | Age: 80
End: 2025-01-23
Payer: MEDICARE

## 2025-01-23 DIAGNOSIS — C61 PROSTATE CANCER (MULTI): ICD-10-CM

## 2025-01-23 DIAGNOSIS — C34.91 NON-SMALL CELL CANCER OF RIGHT LUNG (MULTI): ICD-10-CM

## 2025-01-23 DIAGNOSIS — G62.9 PERIPHERAL POLYNEUROPATHY: Primary | ICD-10-CM

## 2025-01-23 LAB
EST. AVERAGE GLUCOSE BLD GHB EST-MCNC: 120 MG/DL
HBA1C MFR BLD: 5.8 %

## 2025-01-23 RX ORDER — ABIRATERONE 500 MG/1
1000 TABLET ORAL DAILY
Qty: 60 TABLET | Refills: 3 | Status: SHIPPED | OUTPATIENT
Start: 2025-01-23

## 2025-01-23 NOTE — TELEPHONE ENCOUNTER
Marnie states Shalom's Zytiga went from $132 to $791 so their insurance company told them to send the prescription to [facility].    Also she states he has been out of the medication for three days while they have been trying to figure things out so can we do it soon.

## 2025-01-25 LAB
ALBUMIN: 3.8 G/DL (ref 3.4–5)
ALPHA 1 GLOBULIN: 0.4 G/DL (ref 0.2–0.6)
ALPHA 2 GLOBULIN: 0.9 G/DL (ref 0.4–1.1)
BETA GLOBULIN: 1.3 G/DL (ref 0.5–1.2)
GAMMA GLOBULIN: 0.4 G/DL (ref 0.5–1.4)
IMMUNOFIXATION COMMENT: ABNORMAL
PATH REVIEW - SERUM IMMUNOFIXATION: ABNORMAL
PATH REVIEW-SERUM PROTEIN ELECTROPHORESIS: ABNORMAL
PROTEIN ELECTROPHORESIS COMMENT: ABNORMAL

## 2025-01-27 LAB — METHYLMALONATE SERPL-SCNC: <0.2 UMOL/L (ref 0–0.4)

## 2025-01-28 ENCOUNTER — TELEPHONE (OUTPATIENT)
Dept: HEMATOLOGY/ONCOLOGY | Facility: CLINIC | Age: 80
End: 2025-01-28
Payer: MEDICARE

## 2025-01-28 DIAGNOSIS — C61 PROSTATE CANCER (MULTI): ICD-10-CM

## 2025-01-28 DIAGNOSIS — C34.91 NON-SMALL CELL CANCER OF RIGHT LUNG (MULTI): ICD-10-CM

## 2025-01-28 RX ORDER — PREDNISONE 5 MG/1
5 TABLET ORAL DAILY
Qty: 30 TABLET | Refills: 3 | Status: SHIPPED | OUTPATIENT
Start: 2025-01-28

## 2025-01-29 ENCOUNTER — LAB (OUTPATIENT)
Dept: LAB | Facility: HOSPITAL | Age: 80
End: 2025-01-29
Payer: MEDICARE

## 2025-01-29 DIAGNOSIS — G62.9 PERIPHERAL POLYNEUROPATHY: ICD-10-CM

## 2025-01-29 PROCEDURE — 83521 IG LIGHT CHAINS FREE EACH: CPT

## 2025-01-30 LAB
KAPPA LC SERPL-MCNC: 1.01 MG/DL (ref 0.33–1.94)
KAPPA LC/LAMBDA SER: 3.06 {RATIO} (ref 0.26–1.65)
LAMBDA LC SERPL-MCNC: 0.33 MG/DL (ref 0.57–2.63)

## 2025-02-04 LAB — PYRIDOXAL PHOS SERPL-MCNC: 7.2 NG/ML (ref 2.1–21.7)

## 2025-02-06 ENCOUNTER — APPOINTMENT (OUTPATIENT)
Dept: RADIOLOGY | Facility: HOSPITAL | Age: 80
End: 2025-02-06
Payer: MEDICARE

## 2025-02-20 ENCOUNTER — APPOINTMENT (OUTPATIENT)
Dept: HEMATOLOGY/ONCOLOGY | Facility: CLINIC | Age: 80
End: 2025-02-20
Payer: MEDICARE

## 2025-04-14 ENCOUNTER — APPOINTMENT (OUTPATIENT)
Dept: HEMATOLOGY/ONCOLOGY | Facility: CLINIC | Age: 80
End: 2025-04-14
Payer: MEDICARE

## 2025-04-24 ENCOUNTER — SPECIALTY PHARMACY (OUTPATIENT)
Dept: HEMATOLOGY/ONCOLOGY | Facility: CLINIC | Age: 80
End: 2025-04-24
Payer: MEDICARE

## 2025-06-02 ENCOUNTER — PATIENT OUTREACH (OUTPATIENT)
Dept: PRIMARY CARE | Facility: CLINIC | Age: 80
End: 2025-06-02
Payer: MEDICARE

## 2025-06-02 ENCOUNTER — TELEPHONE (OUTPATIENT)
Dept: PRIMARY CARE | Facility: CLINIC | Age: 80
End: 2025-06-02
Payer: MEDICARE

## 2025-06-02 NOTE — TELEPHONE ENCOUNTER
Notified dahlia at [facility] that Dr Waller will follow, pt also has apt scheduled with us for 6/10

## 2025-06-02 NOTE — PROGRESS NOTES
Discharge Facility: [facility]  Discharge Diagnosis: Physical Debility  Admission Date: 11 May 25 ([facility])  Discharge Date: 31 May 25 ([facility])    PCP Appointment Date: 10 [address] 25 (set by another)  Specialist Appointment Date: 3 Tangela 25 (Hem/Onc, Exten @ [facility])  Hospital Encounter and Summary Linked: Yes    Hospital Encounter with Malachi Person MD; Jackson Thompson MD; Farooq Russo MD; Tez Vidal MD     See discharge assessment below for further details     Wrap Up  Wrap Up Additional Comments: Pt's wife stating that patient is doing well since his discharge. wife was able to obtain all medications without difficulty. wife has no questions regarding medications or discharge instructions at this time. wife confirms appt with PCP and is agreeable to contact afterward. (6/2/2025  9:10 AM)  Call End Time: 0910 (6/2/2025  9:10 AM)    Engagement  Call Start Time: 0900 (Spoke with wife) (6/2/2025  9:10 AM)    Medications  Medications reviewed with patient/caregiver?: Yes (6/2/2025  9:10 AM)  Is the patient having any side effects they believe may be caused by any medication additions or changes?: No (6/2/2025  9:10 AM)  Does the patient have all medications ordered at discharge?: Yes (6/2/2025  9:10 AM)  Prescription Comments: wife able to obtain all medicatoins without difficulty (6/2/2025  9:10 AM)  Is the patient taking all medications as directed (includes completed medication regime)?: Yes (6/2/2025  9:10 AM)  Medication Comments: wife has no questions regarding medications at this time. (6/2/2025  9:10 AM)    Appointments  Does the patient have a primary care provider?: Yes (follow up made by another for 10 Tangela 25) (6/2/2025  9:10 AM)  Care Management Interventions: Verified appointment date/time/provider (6/2/2025  9:10 AM)  Has the patient kept scheduled appointments due by today?: Yes (6/2/2025  9:10 AM)  Care Management Interventions: Advised  patient to keep appointment (6/2/2025  9:10 AM)    Self Management  What is the home health agency?: [facility] (6/2/2025  9:10 AM)  Has home health visited the patient within 72 hours of discharge?: Call prior to 72 hours (6/2/2025  9:10 AM)  What Durable Medical Equipment (DME) was ordered?: None (6/2/2025  9:10 AM)    Patient Teaching  Does the patient have access to their discharge instructions?: Yes (6/2/2025  9:10 AM)  Care Management Interventions: Reviewed instructions with patient (6/2/2025  9:10 AM)  What is the patient's perception of their health status since discharge?: Improving (6/2/2025  9:10 AM)  Is the patient/caregiver able to teach back the hierarchy of who to call/visit for symptoms/problems? PCP, Specialist, Home Health nurse, Urgent Care, ED, 911: Yes (6/2/2025  9:10 AM)  Patient/Caregiver Education Comments: None (6/2/2025  9:10 AM)

## 2025-06-02 NOTE — TELEPHONE ENCOUNTER
Rupert from [facility] health  stating billy discharged from [facility] on 5/31 they are wanting to make sure you will follow orders for nursing, home health, PT and OT?    [phone] opt 1

## 2025-06-10 ENCOUNTER — APPOINTMENT (OUTPATIENT)
Dept: PRIMARY CARE | Facility: CLINIC | Age: 80
End: 2025-06-10
Payer: MEDICARE

## 2025-06-10 VITALS
BODY MASS INDEX: 23.99 KG/M2 | WEIGHT: 162 LBS | DIASTOLIC BLOOD PRESSURE: 58 MMHG | HEIGHT: 69 IN | OXYGEN SATURATION: 97 % | TEMPERATURE: 97.5 F | SYSTOLIC BLOOD PRESSURE: 134 MMHG | HEART RATE: 64 BPM

## 2025-06-10 DIAGNOSIS — M54.41 CHRONIC RIGHT-SIDED LOW BACK PAIN WITH RIGHT-SIDED SCIATICA: ICD-10-CM

## 2025-06-10 DIAGNOSIS — I10 ESSENTIAL HYPERTENSION: Primary | ICD-10-CM

## 2025-06-10 DIAGNOSIS — C61 PROSTATE CANCER (MULTI): ICD-10-CM

## 2025-06-10 DIAGNOSIS — G89.29 CHRONIC RIGHT-SIDED LOW BACK PAIN WITH RIGHT-SIDED SCIATICA: ICD-10-CM

## 2025-06-10 DIAGNOSIS — D64.9 ANEMIA, UNSPECIFIED TYPE: ICD-10-CM

## 2025-06-10 PROCEDURE — 3075F SYST BP GE 130 - 139MM HG: CPT | Performed by: FAMILY MEDICINE

## 2025-06-10 PROCEDURE — 1159F MED LIST DOCD IN RCRD: CPT | Performed by: FAMILY MEDICINE

## 2025-06-10 PROCEDURE — 1160F RVW MEDS BY RX/DR IN RCRD: CPT | Performed by: FAMILY MEDICINE

## 2025-06-10 PROCEDURE — 3078F DIAST BP <80 MM HG: CPT | Performed by: FAMILY MEDICINE

## 2025-06-10 PROCEDURE — 99496 TRANSJ CARE MGMT HIGH F2F 7D: CPT | Performed by: FAMILY MEDICINE

## 2025-06-10 RX ORDER — IBUPROFEN 200 MG
200 TABLET ORAL EVERY 6 HOURS PRN
COMMUNITY
Start: 2025-05-27

## 2025-06-10 RX ORDER — POLYETHYLENE GLYCOL 3350 17 G/17G
POWDER, FOR SOLUTION ORAL
COMMUNITY
Start: 2025-05-17

## 2025-06-10 RX ORDER — LOSARTAN POTASSIUM 50 MG/1
50 TABLET ORAL DAILY
COMMUNITY
End: 2025-06-10 | Stop reason: SDUPTHER

## 2025-06-10 RX ORDER — NICOTINE POLACRILEX 4 MG
2 LOZENGE BUCCAL 2 TIMES DAILY
COMMUNITY

## 2025-06-10 RX ORDER — TRAMADOL HYDROCHLORIDE 50 MG/1
50 TABLET, FILM COATED ORAL EVERY 12 HOURS PRN
COMMUNITY
Start: 2025-06-03 | End: 2025-07-03

## 2025-06-10 RX ORDER — ACETAMINOPHEN 500 MG
2 TABLET ORAL
COMMUNITY
Start: 2025-05-19

## 2025-06-10 RX ORDER — FOLIC ACID 1 MG/1
1 TABLET ORAL
COMMUNITY
Start: 2025-04-30

## 2025-06-10 RX ORDER — LOSARTAN POTASSIUM 50 MG/1
50 TABLET ORAL DAILY
Qty: 90 TABLET | Refills: 3 | Status: SHIPPED | OUTPATIENT
Start: 2025-06-10 | End: 2026-06-10

## 2025-06-10 NOTE — PROGRESS NOTES
Subjective   Patient ID: Shalom Horton is a 79 y.o. male who presents for Follow-up (Follow up from [facility] [place]/Having leg pain/Put him on a BP med, 180/80 losartan potassium/).  HPI  History of Present Illness  The patient presents for evaluation of pain, low hemoglobin, prostate cancer, hypertension, and edema.    He was recently discharged from [facility], where he was admitted due to severe knee pain that rendered him unable to walk. His wife and daughter had to transport him via ambulance to the emergency room. Upon arrival, he was administered morphine, which adversely affected his mental state. Consequently, his pain management was transitioned to tramadol and Tylenol. This regimen was continued during his stay at the rehab facility, with an increase in tramadol dosage from half a tablet to a full tablet twice daily. Despite this, he remained unable to stand independently and required assistance for mobility. He has been informed that his knee requires replacement, but his oncologist has deemed him too weak for the procedure due to low hemoglobin levels. He reports no blood loss or presence of blood in stool.    Since his discharge from [facility], he has been receiving home health care, including physical and occupational therapy. He reports that his legs and arms are weaker than normal. He has been working hard on his rehabilitation and has made some progress, such as being able to walk up three steps for the first time in years. He has been trying different digestive enzymes without much success. He is currently on tramadol 50 mg twice daily, prescribed by Dr. Lerner.    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 He is currently undergoing hormone therapy for prostate cancer and is receiving Zometa injections.    He has been diagnosed with hypertension and is requesting a refill of his losartan prescription.    He experiences leg swelling upon waking in the morning,  which can worsen with walking throughout the day. He has a good appetite and consumes eggs, ventura, chicken, and turkey for protein. He has been taking collagen supplements.   Discharge Facility: [facility]  Discharge Diagnosis: Physical Debility  Admission Date: 11 May 25 ([facility])  Discharge Date: 31 May 25 ([facility])     PCP Appointment Date: 10 Tangela 25 (set by another)  Specialist Appointment Date: 3 Tangela 25 (Hem/Onc, [facility]en [facility])  Hospital Encounter and Summary Linked: Yes    Hospital Encounter with Malachi Person MD; Jackson Thompson MD; Farooq Russo MD; Tez Vidal MD      See discharge assessment below for further details      Wrap Up  Wrap Up Additional Comments: Pt's wife stating that patient is doing well since his discharge. wife was able to obtain all medications without difficulty. wife has no questions regarding medications or discharge instructions at this time. wife confirms appt with PCP and is agreeable to contact afterward. (6/2/2025  9:10 AM)  Call End Time: 0910 (6/2/2025  9:10 AM)     Engagement  Call Start Time: 0900 (Spoke with wife) (6/2/2025  9:10 AM)     Medications  Medications reviewed with patient/caregiver?: Yes (6/2/2025  9:10 AM)  Is the patient having any side effects they believe may be caused by any medication additions or changes?: No (6/2/2025  9:10 AM)  Does the patient have all medications ordered at discharge?: Yes (6/2/2025  9:10 AM)  Prescription Comments: wife able to obtain all medicatoins without difficulty (6/2/2025  9:10 AM)  Is the patient taking all medications as directed (includes completed medication regime)?: Yes (6/2/2025  9:10 AM)  Medication Comments: wife has no questions regarding medications at this time. (6/2/2025  9:10 AM)     Appointments  Does the patient have a primary care provider?: Yes (follow up made by another for 10 Tangela 25) (6/2/2025  9:10 AM)  Care Management Interventions:  Verified appointment date/time/provider (6/2/2025  9:10 AM)  Has the patient kept scheduled appointments due by today?: Yes (6/2/2025  9:10 AM)  Care Management Interventions: Advised patient to keep appointment (6/2/2025  9:10 AM)     Self Management  What is the home health agency?: [facility] (6/2/2025  9:10 AM)  Has home health visited the patient within 72 hours of discharge?: Call prior to 72 hours (6/2/2025  9:10 AM)  What Durable Medical Equipment (DME) was ordered?: None (6/2/2025  9:10 AM)     Patient Teaching  Does the patient have access to their discharge instructions?: Yes (6/2/2025  9:10 AM)  Care Management Interventions: Reviewed instructions with patient (6/2/2025  9:10 AM)  What is the patient's perception of their health status since discharge?: Improving (6/2/2025  9:10 AM)  Is the patient/caregiver able to teach back the hierarchy of who to call/visit for symptoms/problems? PCP, Specialist, Home Health nurse, Urgent Care, ED, 911: Yes (6/2/2025  9:10 AM)  Patient/Caregiver Education Comments: None (6/2/2025  9:10 AM)   ASSESSMENT/PLAN:  1. Prostate cancer metastatic to bone (HCC) - ICD9: 185, 198.5, ICD10: C61, C79.51 (primary diagnosis)    2. Adenocarcinoma of right lung (HCC) - ICD9: 162.9, ICD10: C34.91    3. Normocytic Anemia ICD - 10 - CM: D64.9    4. Cancer related pain ICD-10-CM:G89.3    Plan:   TRAMADOL 50 MG ONE TWICE A DAY IF NEEDED FOR SEVERE PAIN  STOP DEXAMETHASONE  RESTART PREDNISONE 5 MG TWICE A DAY  LEUPROLIDE 22.5 MG TODAY  ZOMETA TODAY  SCHEDULE BONE MARROW WITH IR re:NORMOCYTIC ANEMIA AND PROSTATE CANCER-R/O MYELOPTHISIC PROCESS  CLINIC APPOINTMENT 2 WEEKS AFTER BONE MARROW  LAB TODAY: CBC;CMP;PSA    Jonathan Bethea MD   Results  Labs   - Hemoglobin: 06/03/2025, 8.7 g/dL   - Hematocrit: 06/03/2025, 28.4%   - MCV: 06/03/2025, 81.8 fL   - Platelet count: 06/03/2025, 335 x 10^9/L   - Albumin: Low   - Calcium: Low   - Liver functions: Normal   - Blood sugar: Normal   - Kidney  function: Normal     No visits with results within 1 Month(s) from this visit.   Latest known visit with results is:   Orders Only on 01/29/2025   Component Date Value    VITAMIN B6, PLASMA 01/29/2025 7.2       Review of Systems    Objective      Physical Exam  Physical Exam  Extremities: 2+ pitting edema noted in lower extremities, varicose veins present  Assessment/Plan   Assessment & Plan  1. Pain.  - Initially managed with morphine in the emergency room but did not tolerate it well mentally.  - Pain management transitioned to tramadol and Tylenol, continued into the rehab facility.  - Dosage of tramadol adjusted to 50 mg twice a day.  - Advised to continue with tramadol as prescribed by the current doctor.    2. Low hemoglobin.  - Hemoglobin level on 06/03/2025 was 8.7, with a hematocrit of 28.4, indicating significant anemia.  - MCV was 81.8, suggesting a possible iron deficiency.  - Advised to monitor for any signs of blood loss and ensure adequate dietary intake of iron and protein.  - A bone marrow sample will be taken to investigate further.    3. Prostate cancer.  - Currently on hormone therapy for prostate cancer, described as hormone refractory.  - Next steps in treatment will be discussed with the oncologist.  - Last PSA indicated hormone refractory status.    4. Edema.  - Experiences swelling in feet and legs, possibly due to low protein levels and varicose veins.  - Advised to wear compression stockings, elevate feet for 20 minutes several times a day, and limit salt intake.  - Consider adding protein drinks, digestive enzymes, and amino acid supplements to the diet to help with protein absorption.    5. Hypertension.  - Blood pressure currently well-controlled at 134/58 mmHg.  - Prescription for losartan will be sent to the pharmacy.  - Advised to continue monitoring blood pressure and report any symptoms of lightheadedness or dizziness.    Follow-up  - Follow up in approximately 4 to 5 months.      Problem List Items Addressed This Visit    None      This medical note was created with the assistance of artificial intelligence (AI) for documentation purposes. The content has been reviewed and confirmed by the healthcare provider for accuracy and completeness. Patient consented to the use of audio recording and use of AI during their visit.

## 2025-06-11 ENCOUNTER — PATIENT OUTREACH (OUTPATIENT)
Dept: PRIMARY CARE | Facility: CLINIC | Age: 80
End: 2025-06-11
Payer: MEDICARE

## 2025-06-19 ENCOUNTER — TELEPHONE (OUTPATIENT)
Dept: HEMATOLOGY/ONCOLOGY | Facility: CLINIC | Age: 80
End: 2025-06-19
Payer: MEDICARE

## 2025-06-30 ENCOUNTER — PATIENT OUTREACH (OUTPATIENT)
Dept: PRIMARY CARE | Facility: CLINIC | Age: 80
End: 2025-06-30
Payer: MEDICARE

## 2025-06-30 NOTE — PROGRESS NOTES
Successful one month outreach to patient regarding hospitalization as patient continues TCM program.   At time of outreach call the patient feels as if their condition has improved since initial visit with PCP or specialist.  Questions or concerns addressed at this time with patient.   Provided contact information to patient if any further non-emergent needs arise. Spoke with wife

## 2025-07-07 ENCOUNTER — TELEMEDICINE (OUTPATIENT)
Dept: PRIMARY CARE | Facility: CLINIC | Age: 80
End: 2025-07-07
Payer: MEDICARE

## 2025-07-07 DIAGNOSIS — C34.91 NON-SMALL CELL CANCER OF RIGHT LUNG (MULTI): ICD-10-CM

## 2025-07-07 DIAGNOSIS — D64.9 ANEMIA, UNSPECIFIED TYPE: ICD-10-CM

## 2025-07-07 DIAGNOSIS — C79.51 PROSTATE CANCER METASTATIC TO BONE (MULTI): ICD-10-CM

## 2025-07-07 DIAGNOSIS — R41.82 ALTERED MENTAL STATUS, UNSPECIFIED ALTERED MENTAL STATUS TYPE: Primary | ICD-10-CM

## 2025-07-07 DIAGNOSIS — C61 PROSTATE CANCER METASTATIC TO BONE (MULTI): ICD-10-CM

## 2025-07-07 PROBLEM — R53.81 PHYSICAL DEBILITY: Status: ACTIVE | Noted: 2025-05-12

## 2025-07-07 PROBLEM — R29.6 FALLING: Status: ACTIVE | Noted: 2025-05-17

## 2025-07-07 PROBLEM — R11.0 NAUSEA: Status: ACTIVE | Noted: 2025-05-14

## 2025-07-07 PROBLEM — I10 ESSENTIAL (PRIMARY) HYPERTENSION: Status: ACTIVE | Noted: 2023-04-17

## 2025-07-07 PROBLEM — R26.81 UNSTEADINESS ON FEET: Status: ACTIVE | Noted: 2025-05-17

## 2025-07-07 PROBLEM — I11.9 HYPERTENSIVE HEART DISEASE WITHOUT CONGESTIVE HEART FAILURE: Status: ACTIVE | Noted: 2025-05-17

## 2025-07-07 PROBLEM — G89.3 CANCER RELATED PAIN: Status: ACTIVE | Noted: 2025-05-19

## 2025-07-07 PROBLEM — Z20.822 CONTACT WITH AND (SUSPECTED) EXPOSURE TO COVID-19: Status: ACTIVE | Noted: 2023-02-28

## 2025-07-07 PROBLEM — H40.2234 BILATERAL CHRONIC PRIMARY ANGLE-CLOSURE GLAUCOMA, INDETERMINATE STAGE: Status: ACTIVE | Noted: 2025-05-19

## 2025-07-07 PROBLEM — R41.0 DISORIENTATED: Status: ACTIVE | Noted: 2025-05-17

## 2025-07-07 PROBLEM — R41.0 DELIRIUM: Status: ACTIVE | Noted: 2025-05-14

## 2025-07-07 PROCEDURE — 1159F MED LIST DOCD IN RCRD: CPT | Performed by: FAMILY MEDICINE

## 2025-07-07 PROCEDURE — 1160F RVW MEDS BY RX/DR IN RCRD: CPT | Performed by: FAMILY MEDICINE

## 2025-07-07 PROCEDURE — 99214 OFFICE O/P EST MOD 30 MIN: CPT | Performed by: FAMILY MEDICINE

## 2025-07-07 PROCEDURE — 1036F TOBACCO NON-USER: CPT | Performed by: FAMILY MEDICINE

## 2025-07-07 RX ORDER — DEXAMETHASONE 4 MG/1
1 TABLET ORAL
COMMUNITY
Start: 2025-06-09

## 2025-07-07 NOTE — PROGRESS NOTES
Subjective   Patient ID: Shalom Horton is a 79 y.o. male who presents for Hallucinations (Wife states shalom is having hallucinations he thought there were bugs/ worms in his food which is causing him to pick at his food, he is also picking at his skin ).  HPI  History of Present Illness  The patient presents via virtual visit for evaluation of hallucinations, cognitive decline, and fatigue. He is accompanied by his family members.    Last week, he began to exhibit unusual behavior, such as scrutinizing his food for perceived insects or worms, leading to a significant decrease in his food intake. This behavior escalated towards the end of the week, with him refusing to eat pizza due to an imagined infestation of bugs. Today, he was observed pushing corn kernels around on his plate, seemingly searching for something. His cognitive function appears to be deteriorating, with noticeable memory lapses. The onset of these hallucinations has raised concerns among his family members.     In the last few days, he has been picking at his skin, scratching off scabs from minor injuries on his hand and leg, and attempting to extract what he believes to be bugs or worms. He has requested a pair of tweezers for this purpose. His family is seeking advice and a plan of action, as they are worried about potential weight loss due to his cancer diagnosis.     A home nurse visited today and reported that his pulse and oxygen levels were normal, although she had to warm his hands to get a reading. He has been experiencing weakness and fatigue, which his oncologist attributes to low hemoglobin levels. A recent bone marrow biopsy revealed the presence of cancer in his blood and bone marrow. His family has not observed any blood in his stool or urine. They have been encouraging him to drink plenty of fluids.     He struggles with memory issues, often forgetting the date, day of the week, month, and year. His anxiety levels have always been  high, but the recent changes in his eating habits and hallucinations are new developments. He also experiences benign tremors in his right hand. He has been managing to consume yogurt and Ensure with high protein content. He has been getting up three times during the night to urinate.    FAMILY HISTORY  His mother had dementia with hallucinations involving snakes and another man.  His sister has Parkinson's disease.  His brother has benign tremors.     Results  Labs   - Protein: 07/02/2025, Slightly lower   - Alkaline Phosphatase: 07/02/2025, Elevated   - Glucose: 07/02/2025, Normal   - Kidney Function: 07/02/2025, Normal   - Hemoglobin: 07/02/2025, 8.5   - White Blood Cells: 07/02/2025, Normal     Review of Systems    Objective      Physical Exam  Physical Exam  General: Appears weak and fatigued.  Neurological: Benign tremors in the right hand.  Respiratory: Pulse and oxygen levels are good.  Skin: Evidence of picking at skin, scabs scratched off on hand and leg.  Assessment/Plan   Assessment & Plan  1. Hallucinations.  - Experiencing hallucinations, particularly seeing bugs in food and on skin, leading to decreased food intake and weight loss.  - Labs on 07/02/2025 showed low protein, elevated alk phos, normal glucose and kidney function, hemoglobin at 8.5, and normal white blood cells.  - Comprehensive set of labs will be ordered to investigate potential metabolic causes; [facility] of the brain will be [facility] at [facility].  - Referral to neurology for further evaluation; appropriate treatment will be initiated based on lab results.    2. Cognitive decline.  - Gradual cognitive decline, including memory issues and confusion about dates and daily activities.  - Physical exam findings include significant fatigue and weakness, difficulty walking, and low hemoglobin levels.  - Referral to neurology for further evaluation to determine if this is due to dementia, Parkinson's, or another neurological condition.  -  Neurology referral will be made for further assessment and management.    3. Fatigue.  - Significant fatigue, possibly related to low hemoglobin levels and recent bone marrow biopsy indicating cancer in the bone marrow.  - Oncologist suggested low hemoglobin could be contributing to fatigue; no blood loss observed in stool or urination.  - Labs will be ordered to monitor hemoglobin levels and other relevant parameters.  - Appropriate treatment will be initiated based on lab results.    4. Weight loss.  - Losing weight due to decreased food intake caused by hallucinations.  - Physical exam findings include significant weakness and difficulty walking.  - Encouraged to continue consuming high-calorie foods like chips and Ensure to maintain nutritional intake.  - Further dietary recommendations will be provided based on lab results.     Problem List Items Addressed This Visit    None  Visit Diagnoses         Altered mental status, unspecified altered mental status type    -  Primary    Relevant Orders    CBC and Auto Differential    Comprehensive Metabolic Panel    Urinalysis with Reflex Culture and Microscopic    Ammonia    TSH with reflex to Free T4 if abnormal    Vitamin B12    Folate    XR chest 2 views    CT head wo IV contrast    Referral to Neurology            This medical note was created with the assistance of artificial intelligence (AI) for documentation purposes. The content has been reviewed and confirmed by the healthcare provider for accuracy and completeness. Patient consented to the use of audio recording and use of AI during their visit.

## 2025-07-08 ENCOUNTER — TELEPHONE (OUTPATIENT)
Dept: PRIMARY CARE | Facility: CLINIC | Age: 80
End: 2025-07-08
Payer: MEDICARE

## 2025-07-08 NOTE — TELEPHONE ENCOUNTER
Called [employer] and went through prompts for prior auth, it gave me a number to [employer] to submit the  claim [phone] but it said moises when the phone picked up and went through prompts and hung up, form filled out and faxed to [employer] for prior auth

## 2025-07-08 NOTE — TELEPHONE ENCOUNTER
----- Message from Gian Waller sent at 7/7/2025  9:04 PM EDT -----  Ordered a CT of the head that we need to schedule at [facility].  Also consulting neurology at [facility]

## 2025-07-09 ENCOUNTER — TELEPHONE (OUTPATIENT)
Dept: PRIMARY CARE | Facility: CLINIC | Age: 80
End: 2025-07-09
Payer: MEDICARE

## 2025-07-09 DIAGNOSIS — N30.00 ACUTE CYSTITIS WITHOUT HEMATURIA: Primary | ICD-10-CM

## 2025-07-09 RX ORDER — CIPROFLOXACIN 500 MG/1
500 TABLET, FILM COATED ORAL 2 TIMES DAILY
Qty: 10 TABLET | Refills: 0 | Status: SHIPPED | OUTPATIENT
Start: 2025-07-09 | End: 2025-07-14

## 2025-07-09 NOTE — RESULT ENCOUNTER NOTE
Please let the family know that the labs are normal overall. The anemia is still there but improving mildly from what it was.  Urine showed trace leukocytes.  If he is not having burning or increased frequency we don't need to treat for a UTI.

## 2025-07-09 NOTE — TELEPHONE ENCOUNTER
I called and spoke with Marnie (wife) and gave her Shalom's results.   Marnie said he is having some burning when urinating and he goes frequently. He doesn't have the stream like he used to.

## 2025-07-09 NOTE — TELEPHONE ENCOUNTER
----- Message from Gian Waller sent at 7/9/2025  8:23 AM EDT -----  Please let the family know that the labs are normal overall. The anemia is still there but improving mildly from what it was.  Urine showed trace leukocytes.  If he is not having burning or increased   frequency we don't need to treat for a UTI.   ----- Message -----  From: [employer] Results In  Sent: 7/9/2025   5:04 AM EDT  To: Gian Waller, DO

## 2025-07-09 NOTE — TELEPHONE ENCOUNTER
We received a fax from [facility] that the PA for CT needed to go through [facility], called [facility] at [phone] and initiated PA, they were unable to approve the CT based off of medical neccesity, they are sending the case into further review   Case ref#[id number]

## 2025-07-10 LAB
ALBUMIN SERPL-MCNC: 4 G/DL (ref 3.6–5.1)
ALP SERPL-CCNC: 184 U/L (ref 35–144)
ALT SERPL-CCNC: 6 U/L (ref 9–46)
AMMONIA PLAS-SCNC: 42 UMOL/L
ANION GAP SERPL CALCULATED.4IONS-SCNC: 7 MMOL/L (CALC) (ref 7–17)
APPEARANCE UR: CLEAR
AST SERPL-CCNC: 17 U/L (ref 10–35)
BACTERIA #/AREA URNS HPF: ABNORMAL /HPF
BACTERIA UR CULT: ABNORMAL
BACTERIA UR CULT: ABNORMAL
BASOPHILS # BLD AUTO: 19 CELLS/UL (ref 0–200)
BASOPHILS NFR BLD AUTO: 0.4 %
BILIRUB SERPL-MCNC: 0.3 MG/DL (ref 0.2–1.2)
BILIRUB UR QL STRIP: NEGATIVE
BUN SERPL-MCNC: 20 MG/DL (ref 7–25)
CALCIUM SERPL-MCNC: 8.6 MG/DL (ref 8.6–10.3)
CHLORIDE SERPL-SCNC: 102 MMOL/L (ref 98–110)
CO2 SERPL-SCNC: 27 MMOL/L (ref 20–32)
COLOR UR: YELLOW
CREAT SERPL-MCNC: 0.6 MG/DL (ref 0.7–1.22)
EGFRCR SERPLBLD CKD-EPI 2021: 98 ML/MIN/1.73M2
EOSINOPHIL # BLD AUTO: 52 CELLS/UL (ref 15–500)
EOSINOPHIL NFR BLD AUTO: 1.1 %
ERYTHROCYTE [DISTWIDTH] IN BLOOD BY AUTOMATED COUNT: 19.7 % (ref 11–15)
FOLATE SERPL-MCNC: >24 NG/ML
GLUCOSE SERPL-MCNC: 117 MG/DL (ref 65–99)
GLUCOSE UR QL STRIP: NEGATIVE
HCT VFR BLD AUTO: 29.8 % (ref 38.5–50)
HGB BLD-MCNC: 8.9 G/DL (ref 13.2–17.1)
HGB UR QL STRIP: NEGATIVE
HYALINE CASTS #/AREA URNS LPF: ABNORMAL /LPF
KETONES UR QL STRIP: ABNORMAL
LEUKOCYTE ESTERASE UR QL STRIP: ABNORMAL
LYMPHOCYTES # BLD AUTO: 432 CELLS/UL (ref 850–3900)
LYMPHOCYTES NFR BLD AUTO: 9.2 %
MCH RBC QN AUTO: 25.4 PG (ref 27–33)
MCHC RBC AUTO-ENTMCNC: 29.9 G/DL (ref 32–36)
MCV RBC AUTO: 85.1 FL (ref 80–100)
MONOCYTES # BLD AUTO: 423 CELLS/UL (ref 200–950)
MONOCYTES NFR BLD AUTO: 9 %
NEUTROPHILS # BLD AUTO: 3774 CELLS/UL (ref 1500–7800)
NEUTROPHILS NFR BLD AUTO: 80.3 %
NITRITE UR QL STRIP: NEGATIVE
PH UR STRIP: 6 [PH] (ref 5–8)
PLATELET # BLD AUTO: 301 THOUSAND/UL (ref 140–400)
PMV BLD REES-ECKER: 9.6 FL (ref 7.5–12.5)
POTASSIUM SERPL-SCNC: 5.3 MMOL/L (ref 3.5–5.3)
PROT SERPL-MCNC: 6.1 G/DL (ref 6.1–8.1)
PROT UR QL STRIP: ABNORMAL
RBC # BLD AUTO: 3.5 MILLION/UL (ref 4.2–5.8)
RBC #/AREA URNS HPF: ABNORMAL /HPF
SERVICE CMNT-IMP: ABNORMAL
SODIUM SERPL-SCNC: 136 MMOL/L (ref 135–146)
SP GR UR STRIP: 1.02 (ref 1–1.03)
SQUAMOUS #/AREA URNS HPF: ABNORMAL /HPF
TSH SERPL-ACNC: 0.82 MIU/L (ref 0.4–4.5)
VIT B12 SERPL-MCNC: 483 PG/ML (ref 200–1100)
WBC # BLD AUTO: 4.7 THOUSAND/UL (ref 3.8–10.8)
WBC #/AREA URNS HPF: ABNORMAL /HPF

## 2025-08-18 DIAGNOSIS — C61 PROSTATE CANCER (MULTI): Primary | ICD-10-CM

## 2025-08-18 DIAGNOSIS — C79.51 PROSTATE CANCER METASTATIC TO BONE (MULTI): ICD-10-CM

## 2025-08-18 DIAGNOSIS — C34.91 NON-SMALL CELL CANCER OF RIGHT LUNG (MULTI): ICD-10-CM

## 2025-08-18 DIAGNOSIS — C61 PROSTATE CANCER METASTATIC TO BONE (MULTI): ICD-10-CM

## 2025-08-28 ENCOUNTER — CLINICAL SUPPORT (OUTPATIENT)
Dept: PRIMARY CARE | Facility: CLINIC | Age: 80
End: 2025-08-28
Payer: MEDICARE

## 2025-08-28 ENCOUNTER — PATIENT OUTREACH (OUTPATIENT)
Dept: PRIMARY CARE | Facility: CLINIC | Age: 80
End: 2025-08-28
Payer: MEDICARE

## 2025-08-28 ENCOUNTER — TELEPHONE (OUTPATIENT)
Dept: PRIMARY CARE | Facility: CLINIC | Age: 80
End: 2025-08-28
Payer: MEDICARE

## 2025-08-28 DIAGNOSIS — R00.9 ABNORMAL HEART RATE: Primary | ICD-10-CM

## 2025-08-28 PROCEDURE — 93000 ELECTROCARDIOGRAM COMPLETE: CPT | Performed by: FAMILY MEDICINE

## 2025-10-23 ENCOUNTER — APPOINTMENT (OUTPATIENT)
Dept: PRIMARY CARE | Facility: CLINIC | Age: 80
End: 2025-10-23
Payer: MEDICARE